# Patient Record
Sex: MALE | Race: WHITE | NOT HISPANIC OR LATINO | Employment: OTHER | URBAN - METROPOLITAN AREA
[De-identification: names, ages, dates, MRNs, and addresses within clinical notes are randomized per-mention and may not be internally consistent; named-entity substitution may affect disease eponyms.]

---

## 2017-02-06 ENCOUNTER — ALLSCRIPTS OFFICE VISIT (OUTPATIENT)
Dept: OTHER | Facility: OTHER | Age: 77
End: 2017-02-06

## 2017-03-06 ENCOUNTER — ALLSCRIPTS OFFICE VISIT (OUTPATIENT)
Dept: OTHER | Facility: OTHER | Age: 77
End: 2017-03-06

## 2017-03-30 ENCOUNTER — GENERIC CONVERSION - ENCOUNTER (OUTPATIENT)
Dept: OTHER | Facility: OTHER | Age: 77
End: 2017-03-30

## 2017-04-05 ENCOUNTER — ALLSCRIPTS OFFICE VISIT (OUTPATIENT)
Dept: OTHER | Facility: OTHER | Age: 77
End: 2017-04-05

## 2017-05-19 ENCOUNTER — ALLSCRIPTS OFFICE VISIT (OUTPATIENT)
Dept: OTHER | Facility: OTHER | Age: 77
End: 2017-05-19

## 2017-06-22 ENCOUNTER — GENERIC CONVERSION - ENCOUNTER (OUTPATIENT)
Dept: OTHER | Facility: OTHER | Age: 77
End: 2017-06-22

## 2017-08-10 ENCOUNTER — GENERIC CONVERSION - ENCOUNTER (OUTPATIENT)
Dept: OTHER | Facility: OTHER | Age: 77
End: 2017-08-10

## 2017-09-28 ENCOUNTER — GENERIC CONVERSION - ENCOUNTER (OUTPATIENT)
Dept: OTHER | Facility: OTHER | Age: 77
End: 2017-09-28

## 2017-09-28 LAB
A/G RATIO (HISTORICAL): 1.5 (CALC) (ref 1–2.5)
ALBUMIN SERPL BCP-MCNC: 4 G/DL (ref 3.6–5.1)
ALP SERPL-CCNC: 63 U/L (ref 40–115)
ALT SERPL W P-5'-P-CCNC: 14 U/L (ref 9–46)
AST SERPL W P-5'-P-CCNC: 17 U/L (ref 10–35)
BASOPHILS # BLD AUTO: 0.5 %
BASOPHILS # BLD AUTO: 33 CELLS/UL (ref 0–200)
BILIRUB SERPL-MCNC: 0.7 MG/DL (ref 0.2–1.2)
BUN SERPL-MCNC: 16 MG/DL (ref 7–25)
BUN/CREA RATIO (HISTORICAL): NORMAL (CALC) (ref 6–22)
CALCIUM (ADJUSTED FOR ALBUMIN) (HISTORICAL): 9.6 MG/DL (CALC) (ref 8.6–10.2)
CALCIUM SERPL-MCNC: 9.3 MG/DL (ref 8.6–10.3)
CHLORIDE SERPL-SCNC: 106 MMOL/L (ref 98–110)
CHOLEST SERPL-MCNC: 209 MG/DL
CHOLEST/HDLC SERPL: 3.5 (CALC)
CO2 SERPL-SCNC: 29 MMOL/L (ref 20–31)
CREAT SERPL-MCNC: 1.09 MG/DL (ref 0.7–1.18)
DEPRECATED RDW RBC AUTO: 12.5 % (ref 11–15)
EGFR AFRICAN AMERICAN (HISTORICAL): 76 ML/MIN/1.73M2
EGFR-AMERICAN CALC (HISTORICAL): 66 ML/MIN/1.73M2
EOSINOPHIL # BLD AUTO: 356 CELLS/UL (ref 15–500)
EOSINOPHIL # BLD AUTO: 5.4 %
GAMMA GLOBULIN (HISTORICAL): 2.7 G/DL (CALC) (ref 1.9–3.7)
GLUCOSE (HISTORICAL): 86 MG/DL (ref 65–99)
HCT VFR BLD AUTO: 46.6 % (ref 38.5–50)
HDLC SERPL-MCNC: 60 MG/DL
HGB BLD-MCNC: 15.2 G/DL (ref 13.2–17.1)
LDL CHOLESTEROL (HISTORICAL): 130 MG/DL (CALC)
LYMPHOCYTES # BLD AUTO: 1597 CELLS/UL (ref 850–3900)
LYMPHOCYTES # BLD AUTO: 24.2 %
MCH RBC QN AUTO: 33.1 PG (ref 27–33)
MCHC RBC AUTO-ENTMCNC: 32.6 G/DL (ref 32–36)
MCV RBC AUTO: 101.5 FL (ref 80–100)
MONOCYTES # BLD AUTO: 667 CELLS/UL (ref 200–950)
MONOCYTES (HISTORICAL): 10.1 %
NEUTROPHILS # BLD AUTO: 3947 CELLS/UL (ref 1500–7800)
NEUTROPHILS # BLD AUTO: 59.8 %
NON-HDL-CHOL (CHOL-HDL) (HISTORICAL): 149 MG/DL (CALC)
PLATELET # BLD AUTO: 165 THOUSAND/UL (ref 140–400)
PMV BLD AUTO: 10.7 FL (ref 7.5–12.5)
POTASSIUM SERPL-SCNC: 4 MMOL/L (ref 3.5–5.3)
RBC # BLD AUTO: 4.59 MILLION/UL (ref 4.2–5.8)
SODIUM SERPL-SCNC: 142 MMOL/L (ref 135–146)
TOTAL PROTEIN (HISTORICAL): 6.7 G/DL (ref 6.1–8.1)
TRIGL SERPL-MCNC: 91 MG/DL
TSH SERPL DL<=0.05 MIU/L-ACNC: 2.4 MIU/L (ref 0.4–4.5)
WBC # BLD AUTO: 6.6 THOUSAND/UL (ref 3.8–10.8)

## 2017-10-05 ENCOUNTER — ALLSCRIPTS OFFICE VISIT (OUTPATIENT)
Dept: OTHER | Facility: OTHER | Age: 77
End: 2017-10-05

## 2017-10-06 NOTE — PROGRESS NOTES
Assessment  1  Hypertension (401 9) (I10)   2  Depression with anxiety (300 4) (F41 8)   3  Enlarged prostate without lower urinary tract symptoms (luts) (600 00) (N40 0)   4  Mixed hyperlipidemia (272 2) (E78 2)   5  Parkinson's disease (332 0) (G20)   6  Need for influenza vaccination (V04 81) (Z23)    Plan  BPH with obstruction/lower urinary tract symptoms    · Tamsulosin HCl - 0 4 MG Oral Capsule; TAKE ONE TABLET BY MOUTH DAILY  Depression with anxiety, Enlarged prostate without lower urinary tract symptoms (luts),  Hypertension, Mixed hyperlipidemia, Parkinson's disease    · (Q) COMPREHENSIVE METABOLIC PNL W/ADJUSTED CALCIUM; Status:Active; Requested for:25Mar2018;    · (Q) LIPID PANEL WITH REFLEX TO DIRECT LDL; Status:Active; Requested  for:25Mar2018;   Depression with anxiety, Memory loss, Parkinson's disease    · Donepezil HCl - 5 MG Oral Tablet; TAKE 1 TABLET DAILY AS DIRECTED  Enlarged prostate without lower urinary tract symptoms (luts)    · Finasteride 5 MG Oral Tablet; TAKE 1 TABLET DAILY AS DIRECTED  Excessive crying, adult    · Nuedexta 20-10 MG Oral Capsule; take 1 capsule daily  Extrinsic asthma    · Qvar 40 MCG/ACT Inhalation Aerosol Solution; INHALE 1 PUFF TWICE DAILY  Health Maintenance    · *VB - Fall Risk Assessment  (Dx Z13 89 Screen for Neurologic Disorder);  Status:Complete;   Done: 88HDB4859 03:54PM  Need for influenza vaccination    · Fluzone High-Dose 0 5 ML Intramuscular Suspension Prefilled Syringe  Unlinked    · BuPROPion HCl ER (XL) 150 MG Oral Tablet Extended Release 24 Hour; Take  1 tablet daily    Discussion/Summary    Reviewed lab in 9/[id number]/91/60/130 low fat dietokoknormalcurrent meds  specialists as scheduled  flu shot today  [id number] 4/2015  Got pneumovax in 2009  call insurance for coverage of zostavax  urology for PSA yearly  in 2002  Refused more colonoscopy  Signed paper  in 6 months with labs         Possible side effects of new medications were reviewed with the patient/guardian today  The treatment plan was reviewed with the patient/guardian  The patient/guardian understands and agrees with the treatment plan      Chief Complaint  Patient presents for a 6 month follow up      History of Present Illness  Pt is here with his wife  home 130/80 per wife  Not on any meds  control  Not on any meds  disease/Memory loss/excessive crying---FU neurology Dr Peace Mullins Q 4 months  On donepezil and wife noticed it helped  He is on sinemet and nuedexta also  wellbutrin daily which helped  Denies depression  Rhinitis/Asthma---Follows ENT Dr Deejay Gallardo and allergist Dr Iker Ponce  Had nasal polys surgery  Uses the QVAR, dymista  Stable  No recent flare up    with urologist Dr Maribel Dahl from [address] [place] [address] [address] yearly  On tamsulosin and finasteride now  Nocturia 1-2/night    with wife  Still drive, not at night  recent falls  Review of Systems    Constitutional: No fever or chills, feels well, no tiredness, no recent weight gain or weight loss  ENT: no complaints of earache, no hearing loss, no nosebleeds, no nasal discharge, no sore throat, no hoarseness  Cardiovascular: No complaints of slow heart rate, no fast heart rate, no chest pain, no palpitations, no leg claudication, no lower extremity  Respiratory: No complaints of shortness of breath, no wheezing, no cough, no SOB on exertion, no orthopnea or PND  Gastrointestinal: No complaints of abdominal pain, no constipation, no nausea or vomiting, no diarrhea or bloody stools  Musculoskeletal: No complaints of arthralgia, no myalgias, no joint swelling or stiffness, no limb pain or swelling  Preventive Quality 65 and Older: The patient is currently asymptomatic Symptoms Include: no recent fall       Active Problems  1  Acute UTI (urinary tract infection) (599 0) (N39 0)   2  Allergic rhinitis (477 9) (J30 9)   3  Anxiety disorder (300 00) (F41 9)   4  BPH with obstruction/lower urinary tract symptoms (600 01,599 69) (N40 1,N13 8)   5  Bronchitis (490) (J40)   6  Depression with anxiety (300 4) (F41 8)   7  Enlarged prostate without lower urinary tract symptoms (luts) (600 00) (N40 0)   8  Excessive crying, adult (780 95) (R45 83)   9  Extrinsic asthma (493 00) (J45 909)   10  Gross hematuria (599 71) (R31 0)   11  Hematuria (599 70) (R31 9)   12  Hypertension (401 9) (I10)   13  Insomnia (780 52) (G47 00)   14  Lightheadedness (780 4) (R42)   15  Medicare annual wellness visit, initial (V70 0) (Z00 00)   16  Medicare annual wellness visit, subsequent (V70 0) (Z00 00)   17  Memory loss (780 93) (R41 3)   18  Mixed hyperlipidemia (272 2) (E78 2)   19  Need for influenza vaccination (V04 81) (Z23)   20  Need for vaccination with 13-polyvalent pneumococcal conjugate vaccine (V03 82) (Z23)   21  Overactive bladder (596 51) (N32 81)   22  Parkinson's disease (332 0) (G20)    Past Medical History  1  History of Acute bronchitis (466 0) (J20 9)   2  History of Acute laryngitis (464 00) (J04 0)   3  History of Acute sinusitis (461 9) (J01 90)   4  History of Acute viral pharyngitis (462) (J02 8,B97 89)   5  History of Depression with anxiety (300 4) (F41 8)   6  History of dermatitis (V13 3) (Z87 2)   7  History of Impacted cerumen, unspecified laterality (380 4) (H61 20)   8  History of Urinary frequency (788 41) (R35 0)   9  History of Vertigo (780 4) (R42)    Surgical History  1  History of Cataract Surgery   2  History of Complete Colonoscopy   3  History of Hernia Repair   4  History of Needle Biopsy Of Prostate    Family History  Father    1  Family history of Coronary Artery Disease (V17 49)   2  Family history of Stroke Syndrome (V17 1)    Social History   · Being A Social Drinker   · Never A Smoker    Current Meds   1  BuPROPion HCl ER (XL) 150 MG Oral Tablet Extended Release 24 Hour; Take 1 tablet   daily Recorded   2  BusPIRone HCl - 10 MG Oral Tablet; Take 1 tablet daily  Requested for: 32IKT5825; Last   Rx:05Jun2017 Ordered   3   Donepezil HCl - 5 MG Oral Tablet; TAKE 1 TABLET DAILY AS DIRECTED; Therapy: 18NVW6046 to (Evaluate:01Feb2018); Last Rx:07Urk6197 Ordered   4  Finasteride 5 MG Oral Tablet; TAKE 1 TABLET DAILY AS DIRECTED; Therapy: 83XEG7330 to (Evaluate:14May2018)  Requested for: 52XQM3526; Last   Rx:19May2017 Ordered   5  Nuedexta 20-10 MG Oral Capsule; take 1 capsule daily; Therapy: 27DPE6715 to (Evaluate:05May2017); Last Rx:05Apr2017 Ordered   6  Qvar 40 MCG/ACT Inhalation Aerosol Solution; INHALE 1 PUFF TWICE DAILY; Therapy: 33GMW2254 to (Evaluate:05Apr2017); Last Rx:06Mar2017 Ordered   7  Tamsulosin HCl - 0 4 MG Oral Capsule; TAKE ONE TABLET BY MOUTH DAILY; Therapy: 94QZB9254 to (Evaluate:14May2018)  Requested for: 73FJR5136; Last   Rx:19May2017 Ordered   8  Triamcinolone Acetonide 0 1 % External Lotion; APPLY 2-3 TIMES DAILY TO AFFECTED   AREA(S); Therapy: 03Rnw7333 to (Evaluate:29Twq2616)  Requested for: 94BCV0392; Last   Rx:09Bdl6226 Ordered    Allergies  1  No Known Drug Allergies    Vitals  Vital Signs    Recorded: 39WCG9829 01:35PM   Temperature 98 3 F, Tympanic   Heart Rate 74, L Radial   Pulse Quality Normal, L Radial   Respiration Quality Normal   Respiration 16   Systolic 107, LUE, Sitting   Diastolic 82, LUE, Sitting   Height 5 ft 8 in   Weight 172 lb 4 oz   BMI Calculated 26 19   BSA Calculated 1 92   Pain Scale 0     Physical Exam    Constitutional   General appearance: No acute distress, well appearing and well nourished  Pulmonary   Respiratory effort: No increased work of breathing or signs of respiratory distress  Auscultation of lungs: Clear to auscultation, equal breath sounds bilaterally, no wheezes, no rales, no rhonci  Cardiovascular   Auscultation of heart: Normal rate and rhythm, normal S1 and S2, without murmurs  Examination of extremities for edema and/or varicosities: Normal     Carotid pulses: Normal     Abdomen   Abdomen: Non-tender, no masses      Liver and spleen: No hepatomegaly or splenomegaly  Lymphatic   Palpation of lymph nodes in neck: No lymphadenopathy  Musculoskeletal   Gait and station: Normal          Results/Data  (Q) CBC (INCLUDES DIFF/PLT) (REFL) [id number] 08:13AM Patrick Cowden     Test Name Result Flag Reference   WHITE BLOOD CELL COUNT 6 6 Thousand/uL  3 8-10 8   RED BLOOD CELL COUNT 4 59 Million/uL  4 20-5 80   HEMOGLOBIN 15 2 g/dL  13 2-17 1   HEMATOCRIT 46 6 %  38 5-50 0    5 fL H 80 0-100 0   MCH 33 1 pg H 27 0-33 0   MCHC 32 6 g/dL  32 0-36 0   RDW 12 5 %  11 0-15 0   PLATELET COUNT 251 Thousand/uL  140-400   MPV 10 7 fL  7 5-12 5   ABSOLUTE NEUTROPHILS 3947 cells/uL  5999-5107   ABSOLUTE LYMPHOCYTES 1597 cells/uL  850-3900   ABSOLUTE MONOCYTES 667 cells/uL  200-950   ABSOLUTE EOSINOPHILS 356 cells/uL     ABSOLUTE BASOPHILS 33 cells/uL  0-200   NEUTROPHILS 59 8 %     LYMPHOCYTES 24 2 %     MONOCYTES 10 1 %     EOSINOPHILS 5 4 %     BASOPHILS 0 5 %       (Q) COMPREHENSIVE METABOLIC PNL W/ADJUSTED CALCIUM 27Sep2017 08:13AM Patrick Cowden     Test Name Result Flag Reference   GLUCOSE 86 mg/dL  65-99   Fasting reference interval   UREA NITROGEN (BUN) 16 mg/dL  7-25   CREATININE 1 09 mg/dL  0 70-1 18   For patients >52years of age, the reference limit  for Creatinine is approximately 13% higher for people  identified as -American  eGFR NON-AFR   AMERICAN 66 mL/min/1 73m2  > OR = 60   eGFR AFRICAN AMERICAN 76 mL/min/1 73m2  > OR = 60   BUN/CREATININE RATIO   4-91   NOT APPLICABLE (calc)   SODIUM 142 mmol/L  135-146   POTASSIUM 4 0 mmol/L  3 5-5 3   CHLORIDE 106 mmol/L     CARBON DIOXIDE 29 mmol/L  20-31   CALCIUM 9 3 mg/dL  8 6-10 3   CALCIUM (ADJUSTED FOR$ALBUMIN) 9 6 mg/dL (calc)  8 6-10 2   PROTEIN, TOTAL 6 7 g/dL  6 1-8 1   ALBUMIN 4 0 g/dL  3 6-5 1   GLOBULIN 2 7 g/dL (calc)  1 9-3 7   ALBUMIN/GLOBULIN RATIO 1 5 (calc)  1 0-2 5   BILIRUBIN, TOTAL 0 7 mg/dL  0 2-1 2   ALKALINE PHOSPHATASE 63 U/L     AST 17 U/L  10-35   ALT 14 U/L  9-46     (Q) LIPID PANEL WITH REFLEX TO DIRECT LDL 64Acu1340 08:13AM Cierra Lucero     Test Name Result Flag Reference   CHOLESTEROL, TOTAL 209 mg/dL H <200   HDL CHOLESTEROL 60 mg/dL  >96   TRIGLICERIDES 91 mg/dL  <947   LDL-CHOLESTEROL 130 mg/dL (calc) H    Reference range: <100     Desirable range <100 mg/dL for patients with CHD or  diabetes and <70 mg/dL for diabetic patients with  known heart disease  LDL-C is now calculated using the Amilcar-Tripp   calculation, which is a validated novel method providing   better accuracy than the Friedewald equation in the   estimation of LDL-C  Dawn Kwon al  [facility]  5744;870(20): 5760-1642   (http://Dentalink/faq/VXT063)   CHOL/HDLC RATIO 3 5 (calc)  <5 0   NON HDL CHOLESTEROL 149 mg/dL (calc) H <130   For patients with diabetes plus 1 major ASCVD risk   factor, treating to a non-HDL-C goal of <100 mg/dL   (LDL-C of <70 mg/dL) is considered a therapeutic   option  (Q) TSH, 3RD GENERATION W/REFLEX TO FT4 [id number] 08:13AM Cierra Lucero   REPORT COMMENT:  FASTING:YES     Test Name Result Flag Reference   TSH W/REFLEX TO FT4 2 40 mIU/L  0 40-4 50     Health Management  Health Maintenance   Medicare Annual Wellness Visit; every 1 year; Next Due: 86SNK7559; Overdue    Future Appointments    Date/Time Provider Specialty Site   04/02/2018 01:20 PM Cierra Lucero MD [address]   05/25/2018 01:45 PM Yvette Dumont [facility] Neurology [facility] [place]     Signatures   Electronically signed by :  Jinny Redding MD; Oct  5 2017  3:55PM EST                       (Author)

## 2017-10-23 ENCOUNTER — GENERIC CONVERSION - ENCOUNTER (OUTPATIENT)
Dept: OTHER | Facility: OTHER | Age: 77
End: 2017-10-23

## 2017-11-13 ENCOUNTER — GENERIC CONVERSION - ENCOUNTER (OUTPATIENT)
Dept: OTHER | Facility: OTHER | Age: 77
End: 2017-11-13

## 2017-11-13 ENCOUNTER — GENERIC CONVERSION - ENCOUNTER (OUTPATIENT)
Dept: FAMILY MEDICINE CLINIC | Facility: CLINIC | Age: 77
End: 2017-11-13

## 2017-12-18 ENCOUNTER — GENERIC CONVERSION - ENCOUNTER (OUTPATIENT)
Dept: OTHER | Facility: OTHER | Age: 77
End: 2017-12-18

## 2017-12-18 ENCOUNTER — GENERIC CONVERSION - ENCOUNTER (OUTPATIENT)
Dept: FAMILY MEDICINE CLINIC | Facility: CLINIC | Age: 77
End: 2017-12-18

## 2018-01-10 NOTE — RESULT NOTES
Message   DW pt on 10/5/2016 OV  Verified Results  (Q) CBC (INCLUDES DIFF/PLT) (REFL) [id number] 09:11AM Zan Grace     Test Name Result Flag Reference   WHITE BLOOD CELL COUNT 5 7 Thousand/uL  3 8-10 8   RED BLOOD CELL COUNT 4 53 Million/uL  4 20-5 80   HEMOGLOBIN 14 8 g/dL  13 2-17 1   HEMATOCRIT 45 0 %  38 5-50 0   MCV 99 3 fL  80 0-100 0   MCH 32 7 pg  27 0-33 0   MCHC 32 9 g/dL  32 0-36 0   RDW 13 9 %  11 0-15 0   PLATELET COUNT 804 Thousand/uL  140-400   MPV 9 0 fL  7 5-11 5   ABSOLUTE NEUTROPHILS 3072 cells/uL  6507-5989   ABSOLUTE LYMPHOCYTES 1528 cells/uL  850-3900   ABSOLUTE MONOCYTES 593 cells/uL  200-950   ABSOLUTE EOSINOPHILS 479 cells/uL     ABSOLUTE BASOPHILS 29 cells/uL  0-200   NEUTROPHILS 53 9 %     LYMPHOCYTES 26 8 %     MONOCYTES 10 4 %     EOSINOPHILS 8 4 %     BASOPHILS 0 5 %       (Q) COMPREHENSIVE METABOLIC PNL W/ADJUSTED CALCIUM [id number] 09:11AM Zan Grace     Test Name Result Flag Reference   GLUCOSE 96 mg/dL  65-99   Fasting reference interval   UREA NITROGEN (BUN) 15 mg/dL  7-25   CREATININE 1 05 mg/dL  0 70-1 18   For patients >52years of age, the reference limit  for Creatinine is approximately 13% higher for people  identified as -American  eGFR NON-AFR   AMERICAN 69 mL/min/1 73m2  > OR = 60   eGFR AFRICAN AMERICAN 80 mL/min/1 73m2  > OR = 60   BUN/CREATININE RATIO   5-92   NOT APPLICABLE (calc)   SODIUM 141 mmol/L  135-146   POTASSIUM 4 4 mmol/L  3 5-5 3   CHLORIDE 104 mmol/L     CARBON DIOXIDE 29 mmol/L  20-31   CALCIUM 9 5 mg/dL  8 6-10 3   CALCIUM (ADJUSTED FOR$ALBUMIN) 9 6 mg/dL (calc)  8 6-10 2   PROTEIN, TOTAL 6 9 g/dL  6 1-8 1   ALBUMIN 4 3 g/dL  3 6-5 1   GLOBULIN 2 6 g/dL (calc)  1 9-3 7   ALBUMIN/GLOBULIN RATIO 1 7 (calc)  1 0-2 5   BILIRUBIN, TOTAL 0 8 mg/dL  0 2-1 2   ALKALINE PHOSPHATASE 49 U/L     AST 17 U/L  10-35   ALT 19 U/L  9-46     (Q) LIPID PANEL WITH REFLEX TO DIRECT LDL [id number] 09:11AM Zan Gathers     Test Name Result Flag Reference   CHOLESTEROL, TOTAL 209 mg/dL H 125-200   HDL CHOLESTEROL 64 mg/dL  > OR = 40   TRIGLICERIDES 77 mg/dL  <031   LDL-CHOLESTEROL 130 mg/dL (calc) H <130   Desirable range <100 mg/dL for patients with CHD or  diabetes and <70 mg/dL for diabetic patients with  known heart disease  CHOL/HDLC RATIO 3 3 (calc)  < OR = 5 0   NON HDL CHOLESTEROL 145 mg/dL (calc)     Target for non-HDL cholesterol is 30 mg/dL higher than   LDL cholesterol target       (Q) TSH, 3RD GENERATION W/REFLEX TO FT4 [id number] 09:11AM Sury Eastern   REPORT COMMENT:  FASTING:YES     Test Name Result Flag Reference   TSH W/REFLEX TO FT4 2 36 mIU/L  0 40-4 50

## 2018-01-13 VITALS
SYSTOLIC BLOOD PRESSURE: 138 MMHG | HEIGHT: 68 IN | RESPIRATION RATE: 16 BRPM | WEIGHT: 172.25 LBS | HEART RATE: 74 BPM | DIASTOLIC BLOOD PRESSURE: 82 MMHG | TEMPERATURE: 98.3 F | BODY MASS INDEX: 26.11 KG/M2

## 2018-01-13 VITALS
HEIGHT: 68 IN | RESPIRATION RATE: 16 BRPM | SYSTOLIC BLOOD PRESSURE: 132 MMHG | WEIGHT: 170 LBS | BODY MASS INDEX: 25.76 KG/M2 | TEMPERATURE: 98 F | DIASTOLIC BLOOD PRESSURE: 80 MMHG | HEART RATE: 68 BPM

## 2018-01-13 VITALS
DIASTOLIC BLOOD PRESSURE: 82 MMHG | HEART RATE: 64 BPM | HEIGHT: 68 IN | WEIGHT: 174.5 LBS | SYSTOLIC BLOOD PRESSURE: 138 MMHG | BODY MASS INDEX: 26.45 KG/M2

## 2018-01-13 VITALS
SYSTOLIC BLOOD PRESSURE: 144 MMHG | HEIGHT: 66 IN | DIASTOLIC BLOOD PRESSURE: 88 MMHG | RESPIRATION RATE: 10 BRPM | OXYGEN SATURATION: 93 % | BODY MASS INDEX: 27.97 KG/M2 | HEART RATE: 68 BPM | TEMPERATURE: 97.8 F | WEIGHT: 174 LBS

## 2018-01-13 NOTE — RESULT NOTES
Message       Pt Seen yesterday for re-eval of hematuria and UTI  Seen by Dr Abdon Holguin who retrieve 5/14 Ucx show Staph sen to Bactrim, antibx was switch and cipro not working and pt was referred to uro as per note         thanks     Verified Results  (1) URINE CULTURE [id number] 12:28PM Komal Sanchez     Test Name Result Flag Reference   CLINICAL REPORT (Report)     Test:        Urine culture  Specimen Type:   Urine  Specimen Date:   5/14/2016 12:28 PM  Result Date:    5/17/2016 11:01 AM  Result Status:   Final result  Resulting Lab:   [facility] [address] [place], [place] [address] [address]            Tel: [phone]      CULTURE                                       ------------------                                   >100,000 cfu/ml Staphylococcus coagulase negative      SUSCEPTIBILITY                                   ------------------                                                       Staphylococcus coagulase                       negative  METHOD                 ANDREA  -------------------------------------  ----------------------------  AMOXICILLIN + CLAVULANATE        <=4/2 ug/ml   Susceptible  AMPICILLIN ($$)             <=2 00 ug/ml   Resistant  AMPICILLIN + SULBACTAM ($)       <=8/4 ug/ml   Susceptible  CEFAZOLIN ($)              <=8 00 ug/ml   Susceptible  GENTAMICIN ($$)             <=4 ug/ml    Susceptible  NITROFURANTOIN             <=32 ug/ml    Susceptible  OXACILLIN                <=0 25 ug/ml   Susceptible  TETRACYCLINE              <=4 ug/ml    Susceptible  TRIMETHOPRIM + SULFAMETHOXAZOLE ($$$)  <=0 5/9 5 ug/ml Susceptible  VANCOMYCIN ($)             1 00 ug/ml    Susceptible       Signatures   Electronically signed by : DAGOBERTO Sapp ; May 18 2016  1:29PM EST                       (Author)

## 2018-01-14 VITALS
OXYGEN SATURATION: 92 % | DIASTOLIC BLOOD PRESSURE: 80 MMHG | WEIGHT: 175 LBS | TEMPERATURE: 97.1 F | HEART RATE: 64 BPM | RESPIRATION RATE: 16 BRPM | HEIGHT: 68 IN | BODY MASS INDEX: 26.52 KG/M2 | SYSTOLIC BLOOD PRESSURE: 120 MMHG

## 2018-01-14 NOTE — RESULT NOTES
Message   US kidney showed bilateral simple renal cysts and nonobstructing kidney stone  Please ask pt to follow up with urology as discussed in [facility] [facility]  Verified Results  [facility] Edmundo Jordan [id number] 02:03PM Myriam Calzada Order Number: [id number]     Test Name Result Flag Reference   US KIDNEY AND BLADDER (Report)     RENAL ULTRASOUND     INDICATION: Gross painless hematuria  COMPARISON: None  TECHNIQUE:  Ultrasound of the retroperitoneum was performed with a curvilinear transducer utilizing volumetric sweeps and still imaging techniques  FINDINGS:     KIDNEYS:   Symmetric and normal size  Right kidney: 11 0 x 4 4 cm  Normal echogenicity and contour  No suspicious masses detected  Simple upper pole cortical cyst measuring 1 7 x 1 5 cm  No hydronephrosis  Nonobstructing 9 mm right mid to upper pole calculus  No perinephric fluid collections  Left kidney: 11 4 x 6 1 cm  Normal echogenicity and contour  No suspicious masses detected  Simple lower pole cyst measures 1 3 x 1 3 cm  No hydronephrosis  Nonobstructing lower pole calculus measuring 5 mm  Additional adjacent lower pole nonobstructing calculus measuring 12 mm  No perinephric fluid collections  URETERS:   Nonvisualized  BLADDER:    Distended with a trabeculated wall  Small bladder diverticulum near the dome  Bilateral ureteral jets detected  Marked prostatomegaly measuring 6 7 x 7 0 x 6 9 cm  Prevoid bladder volume 402 mL  Post void residual bladder volume 245 mL  IMPRESSION:     Bilateral simple renal cysts and nonobstructing intrarenal calculi as described  No hydronephrosis  Trabeculated bladder wall with small diverticulum near the dome  Findings likely related to chronic outlet obstruction due to prostatomegaly measuring 6 7 x 7 0 x 6 9 cm  Prevoid bladder volume 402 mL  Post void residual bladder volume 245 mL  Workstation performed: [id number]     Signed by:    Homar Perez Agnes Calvillo MD   5/24/16

## 2018-01-15 NOTE — RESULT NOTES
Verified Results  (Q) CBC (INCLUDES DIFF/PLT) (REFL) [id number] 08:13AM Carles Eastern     Test Name Result Flag Reference   WHITE BLOOD CELL COUNT 6 6 Thousand/uL  3 8-10 8   RED BLOOD CELL COUNT 4 59 Million/uL  4 20-5 80   HEMOGLOBIN 15 2 g/dL  13 2-17 1   HEMATOCRIT 46 6 %  38 5-50 0    5 fL H 80 0-100 0   MCH 33 1 pg H 27 0-33 0   MCHC 32 6 g/dL  32 0-36 0   RDW 12 5 %  11 0-15 0   PLATELET COUNT 121 Thousand/uL  140-400   MPV 10 7 fL  7 5-12 5   ABSOLUTE NEUTROPHILS 3947 cells/uL  7983-2500   ABSOLUTE LYMPHOCYTES 1597 cells/uL  850-3900   ABSOLUTE MONOCYTES 667 cells/uL  200-950   ABSOLUTE EOSINOPHILS 356 cells/uL     ABSOLUTE BASOPHILS 33 cells/uL  0-200   NEUTROPHILS 59 8 %     LYMPHOCYTES 24 2 %     MONOCYTES 10 1 %     EOSINOPHILS 5 4 %     BASOPHILS 0 5 %       (Q) COMPREHENSIVE METABOLIC PNL W/ADJUSTED CALCIUM [id number] 08:13AM Carles Eastern     Test Name Result Flag Reference   GLUCOSE 86 mg/dL  65-99   Fasting reference interval   UREA NITROGEN (BUN) 16 mg/dL  7-25   CREATININE 1 09 mg/dL  0 70-1 18   For patients >52years of age, the reference limit  for Creatinine is approximately 13% higher for people  identified as -American  eGFR NON-AFR   AMERICAN 66 mL/min/1 73m2  > OR = 60   eGFR AFRICAN AMERICAN 76 mL/min/1 73m2  > OR = 60   BUN/CREATININE RATIO   9-46   NOT APPLICABLE (calc)   SODIUM 142 mmol/L  135-146   POTASSIUM 4 0 mmol/L  3 5-5 3   CHLORIDE 106 mmol/L     CARBON DIOXIDE 29 mmol/L  20-31   CALCIUM 9 3 mg/dL  8 6-10 3   CALCIUM (ADJUSTED FOR$ALBUMIN) 9 6 mg/dL (calc)  8 6-10 2   PROTEIN, TOTAL 6 7 g/dL  6 1-8 1   ALBUMIN 4 0 g/dL  3 6-5 1   GLOBULIN 2 7 g/dL (calc)  1 9-3 7   ALBUMIN/GLOBULIN RATIO 1 5 (calc)  1 0-2 5   BILIRUBIN, TOTAL 0 7 mg/dL  0 2-1 2   ALKALINE PHOSPHATASE 63 U/L     AST 17 U/L  10-35   ALT 14 U/L  9-46     (Q) LIPID PANEL WITH REFLEX TO DIRECT LDL [id number] 08:13AM [facility]     Test Name Result Flag Reference   CHOLESTEROL, TOTAL 209 mg/dL H <200   HDL CHOLESTEROL 60 mg/dL  >94   TRIGLICERIDES 91 mg/dL  <740   LDL-CHOLESTEROL 130 mg/dL (calc) H    Reference range: <100     Desirable range <100 mg/dL for patients with CHD or  diabetes and <70 mg/dL for diabetic patients with  known heart disease  LDL-C is now calculated using the Amilcar-Tripp   calculation, which is a validated novel method providing   better accuracy than the Friedewald equation in the   estimation of LDL-C  Levi Joe al  [facility]  7626;512(50): 3390-2441   (http://REVENTIVE/faq/RZU128)   CHOL/HDLC RATIO 3 5 (calc)  <5 0   NON HDL CHOLESTEROL 149 mg/dL (calc) H <130   For patients with diabetes plus 1 major ASCVD risk   factor, treating to a non-HDL-C goal of <100 mg/dL   (LDL-C of <70 mg/dL) is considered a therapeutic   option       (Q) TSH, 3RD GENERATION W/REFLEX TO FT4 [id number] 08:13AM Dylon Rob   REPORT COMMENT:  FASTING:YES     Test Name Result Flag Reference   TSH W/REFLEX TO FT4 2 40 mIU/L  0 40-4 50

## 2018-01-16 NOTE — RESULT NOTES
Message   Hg 13 9 No anemia        Verified Results  (Q) CBC (H/H, RBC, INDICES, WBC, PLT) [id number] 12:15PM Marleny Hernandez   REPORT COMMENT:  FASTING:NO     Test Name Result Flag Reference   WHITE BLOOD CELL COUNT 7 3 Thousand/uL  3 8-10 8   RED BLOOD CELL COUNT 4 30 Million/uL  4 20-5 80   HEMOGLOBIN 13 9 g/dL  13 2-17 1   HEMATOCRIT 43 5 %  38 5-50 0    2 fL H 80 0-100 0   MCH 32 4 pg  27 0-33 0   MCHC 32 0 g/dL  32 0-36 0   RDW 14 7 %  11 0-15 0   PLATELET COUNT 843 Thousand/uL  140-400   MPV 9 2 fL  7 5-11 5

## 2018-01-16 NOTE — RESULT NOTES
Message   DW pt on 4/6/2016 OV  Verified Results  (Q) LIPID PANEL WITH REFLEX TO DIRECT LDL [id number] 08:36AM Homar Lerma     Test Name Result Flag Reference   CHOLESTEROL, TOTAL 140 mg/dL  125-200   HDL CHOLESTEROL 58 mg/dL  > OR = 40   TRIGLICERIDES 54 mg/dL  <807   LDL-CHOLESTEROL 71 mg/dL (calc)  <130   Desirable range <100 mg/dL for patients with CHD or  diabetes and <70 mg/dL for diabetic patients with  known heart disease  CHOL/HDLC RATIO 2 4 (calc)  < OR = 5 0   NON HDL CHOLESTEROL 82 mg/dL (calc)     Target for non-HDL cholesterol is 30 mg/dL higher than   LDL cholesterol target

## 2018-01-17 NOTE — RESULT NOTES
Message   DW pt on 4/5/2017 OV  Verified Results  (Q) COMPREHENSIVE METABOLIC PNL W/ADJUSTED CALCIUM [id number] 08:31AM [facility]   REPORT COMMENT:  FASTING:YES     Test Name Result Flag Reference   GLUCOSE 95 mg/dL  65-99   Fasting reference interval   UREA NITROGEN (BUN) 15 mg/dL  7-25   CREATININE 1 14 mg/dL  0 70-1 18   For patients >52years of age, the reference limit  for Creatinine is approximately 13% higher for people  identified as -American  eGFR NON-AFR  AMERICAN 63 mL/min/1 73m2  > OR = 60   eGFR AFRICAN AMERICAN 73 mL/min/1 73m2  > OR = 60   BUN/CREATININE RATIO   4-22   NOT APPLICABLE (calc)   SODIUM 142 mmol/L  135-146   POTASSIUM 4 0 mmol/L  3 5-5 3   CHLORIDE 107 mmol/L     CARBON DIOXIDE 27 mmol/L  20-31   CALCIUM 9 2 mg/dL  8 6-10 3   CALCIUM (ADJUSTED FOR$ALBUMIN) 9 4 mg/dL (calc)  8 6-10 2   PROTEIN, TOTAL 6 8 g/dL  6 1-8 1   ALBUMIN 4 1 g/dL  3 6-5 1   GLOBULIN 2 7 g/dL (calc)  1 9-3 7   ALBUMIN/GLOBULIN RATIO 1 5 (calc)  1 0-2 5   BILIRUBIN, TOTAL 0 7 mg/dL  0 2-1 2   ALKALINE PHOSPHATASE 53 U/L     AST 18 U/L  10-35   ALT 20 U/L  9-46     (Q) LIPID PANEL WITH REFLEX TO DIRECT LDL [id number] 08:31AM Sury Cates     Test Name Result Flag Reference   CHOLESTEROL, TOTAL 213 mg/dL H 125-200   HDL CHOLESTEROL 66 mg/dL  > OR = 40   TRIGLICERIDES 67 mg/dL  <718   LDL-CHOLESTEROL 134 mg/dL (calc) H <130   Desirable range <100 mg/dL for patients with CHD or  diabetes and <70 mg/dL for diabetic patients with  known heart disease  CHOL/HDLC RATIO 3 2 (calc)  < OR = 5 0   NON HDL CHOLESTEROL 147 mg/dL (calc)     Target for non-HDL cholesterol is 30 mg/dL higher than   LDL cholesterol target

## 2018-01-22 VITALS
TEMPERATURE: 97.6 F | BODY MASS INDEX: 26.58 KG/M2 | HEIGHT: 68 IN | HEART RATE: 64 BPM | SYSTOLIC BLOOD PRESSURE: 128 MMHG | DIASTOLIC BLOOD PRESSURE: 76 MMHG | WEIGHT: 175.38 LBS | RESPIRATION RATE: 16 BRPM

## 2018-01-22 VITALS
BODY MASS INDEX: 26.54 KG/M2 | DIASTOLIC BLOOD PRESSURE: 86 MMHG | SYSTOLIC BLOOD PRESSURE: 140 MMHG | RESPIRATION RATE: 16 BRPM | WEIGHT: 175.13 LBS | TEMPERATURE: 97.9 F | HEART RATE: 60 BPM | HEIGHT: 68 IN

## 2018-01-22 VITALS — OXYGEN SATURATION: 94 %

## 2018-01-24 NOTE — PROGRESS NOTES
Assessment    1  BPH with obstruction/lower urinary tract symptoms (600 01,599 69) (N40 1,N13 8)    Plan  BPH with obstruction/lower urinary tract symptoms    · Urine Dip Non-Automated- POC; Status:Active - Perform Order; Requested  for:96Ewk8834;    Performed: In Office; ELLY:[id number];BCCXUIZ; For:BPH with obstruction/lower urinary tract symptoms; Ordered By:Alexander Kenney;  BPH with obstruction/lower urinary tract symptoms, Overactive bladder    · Measure Post Void Residual - POC; Status:Active - Perform Order; Requested  for:53Akx4453;    Perform: In Office; ASZ:[id number];DJHLDXF; For:BPH with obstruction/lower urinary tract symptoms, Overactive bladder; Ordered By:Florence Wong;  BPH with obstruction/lower urinary tract symptoms, Overactive bladder, Parkinson's  disease    · Follow-up visit in 6 weeks Evaluation and Treatment  Follow-up  Status: Complete  Done:  [id number]   Ordered; For: BPH with obstruction/lower urinary tract symptoms, Overactive bladder, Parkinson's disease; Ordered By: Kaleb Rodriguez Performed:  Due: 14QKT2128; Last Updated By: Fernando Cleaning; 8/29/2016 11:18:39 AM  Overactive bladder    · Myrbetriq 50 MG Oral Tablet Extended Release 24 Hour; Take 1 tablet daily   Rx By: Kaleb Rodriguez; Dispense: 0 Days ; #:90 Tablet Extended Release 24 Hour; Refill: 3; For: Overactive bladder; ANABELLA = N; Verified Transmission to [address] [place] [address],  [address]; Last Updated By: System, [employer]; 8/29/2016 11:18:30 AM    Discussion/Summary  Discussion Summary:   Sheeba De Leon is a 77year-old gentleman presents for follow up on his overactive bladder managed by Dr Ida Gonzales  Unfortunately, patient ran out of medication  Therefore it is unclear if he empties well on the medication  Myrbetriq 50 mg is given to patient and a prescription sent electronically to his pharmacy  Side effect profile reviewed  We will follow up in 4-6 weeks with a PVR at the visit   We did discuss that this medication may worsen his dementia and Parkinson's  Medication SE Review and Pt Understands Tx: Possible side effects of new medications were reviewed with the patient/guardian today  Understands and agrees with treatment plan: The treatment plan was reviewed with the patient/guardian  The patient/guardian understands and agrees with the treatment plan      Chief Complaint  Chief Complaint Free Text Note Form: PVR; BPH      History of Present Illness  HPI: Dianne Mon is a 77year-old gentleman presents for follow up on his overactive bladder  He was previously followed by Dr Tila López for elevated PSA  He had a negative prostate biopsy in 2003  PSA has been relatively stable since then in the 5-6 range  He has developed relatively significant Parkinson's disease along with some dementia  He is a poor historian, and his wife answers most of the questions  He had a recent ultrasound which revealed bilateral kidney stones, nonobstructing  He is taking tamsulosin daily  He was having improved urination with the Myrbetriq, however, he ran out a week or so ago  Now with worsening nocturia and incontinence  Review of Systems  Complete-Male Urology:   Constitutional: No fever or chills, feels well, no tiredness, no recent weight gain or weight loss  Respiratory: No complaints of shortness of breath, no wheezing, no cough, no SOB on exertion, no orthopnea or PND  Cardiovascular: No complaints of slow heart rate, no fast heart rate, no chest pain, no palpitations, no leg claudication, no lower extremity  Gastrointestinal: No complaints of abdominal pain, no constipation, no nausea or vomiting, no diarrhea or bloody stools  Genitourinary: Empty sensation, incontinence, nocturia, stream quality good, urinary stream starts and stops, but no dysuria, no urinary hesitancy, no hematuria and no feelings of urinary urgency  Musculoskeletal: No complaints of arthralgia, no myalgias, no joint swelling or stiffness, no limb pain or swelling  Integumentary: No complaints of skin rash or skin lesions, no itching, no skin wound, no dry skin  Hematologic/Lymphatic: No complaints of swollen glands, no swollen glands in the neck, does not bleed easily, no easy bruising  Neurological: No compliants of headache, no confusion, no convulsions, no numbness or tingling, no dizziness or fainting, no limb weakness, no difficulty walking  ROS Reviewed:   ROS reviewed  Active Problems    1  Acute UTI (urinary tract infection) (599 0) (N39 0)   2  Allergic rhinitis (477 9) (J30 9)   3  Anxiety disorder (300 00) (F41 9)   4  BPH with obstruction/lower urinary tract symptoms (600 01,599 69) (N40 1,N13 8)   5  Bronchitis (490) (J40)   6  Depression with anxiety (300 4) (F41 8)   7  Dermatitis (692 9) (L30 9)   8  Dizziness (780 4) (R42)   9  Enlarged prostate without lower urinary tract symptoms (luts) (600 00) (N40 0)   10  Extrinsic asthma (493 00) (J45 909)   11  Gross hematuria (599 71) (R31 0)   12  Hematuria (599 70) (R31 9)   13  Hypertension (401 9) (I10)   14  Insomnia (780 52) (G47 00)   15  Medicare annual wellness visit, initial (V70 0) (Z00 00)   16  Memory loss (780 93) (R41 3)   17  Mixed hyperlipidemia (272 2) (E78 2)   18  Need for influenza vaccination (V04 81) (Z23)   19  Need for vaccination with 13-polyvalent pneumococcal conjugate vaccine (V03 82) (Z23)   20  Overactive bladder (596 51) (N32 81)   21  Parkinson's disease (332 0) (G20)    Past Medical History    1  History of Acute bronchitis (466 0) (J20 9)   2  History of Acute laryngitis (464 00) (J04 0)   3  History of Acute sinusitis (461 9) (J01 90)   4  History of Acute viral pharyngitis (462) (J02 8,B97 89)   5  History of Depression with anxiety (300 4) (F41 8)   6  History of Impacted cerumen, unspecified laterality (380 4) (H61 20)   7  History of Urinary frequency (788 41) (R35 0)   8   History of Vertigo (780 4) (R42)  Active Problems And Past Medical History Reviewed: The active problems and past medical history were reviewed and updated today  Surgical History    1  History of Cataract Surgery   2  History of Complete Colonoscopy   3  History of Hernia Repair   4  History of Needle Biopsy Of Prostate  Surgical History Reviewed: The surgical history was reviewed and updated today  Family History  Father    1  Family history of Coronary Artery Disease (V17 49)   2  Family history of Stroke Syndrome (V17 1)  Family History Reviewed: The family history was reviewed and updated today  Social History    · Being A Social Drinker   · Never A Smoker  Social History Reviewed: The social history was reviewed and updated today  The social history was reviewed and is unchanged  Current Meds   1  BuPROPion HCl ER (XL) 150 MG Oral Tablet Extended Release 24 Hour; Take 1 tablet   daily Recorded   2  BusPIRone HCl - 10 MG Oral Tablet; Take 1 tablet twice daily  Requested for:   50Dxw9434; Last Rx:[id number] Ordered   3  Carbidopa-Levodopa  MG Oral Tablet Recorded   4  Donepezil HCl - 10 MG Oral Tablet; take 1 tablet by mouth every day; Therapy: 30ICL4288 to (Evaluate:48Vnh9576); Last Rx:83Tgs7275 Ordered   5  Myrbetriq 50 MG Oral Tablet Extended Release 24 Hour; TAKE 1 CAPSULE Daily   Recorded   6  Tamsulosin HCl - 0 4 MG Oral Capsule; TAKE ONE CAPSULE BY MOUTH EVERY NIGHT   AT BEDTIME; Therapy: 00PFS6407 to (Evaluate:89Szy3788)  Requested for: 79Qko2245; Last   Rx:60Mqo7397 Ordered   7  Triamcinolone Acetonide 0 1 % External Lotion; APPLY 2-3 TIMES DAILY TO AFFECTED   AREA(S); Therapy: 52Ccr8794 to (Evaluate:52Gjl6623)  Requested for: 94Oap2704; Last   Rx:22Iuw3030 Ordered  Medication List Reviewed: The medication list was reviewed and updated today  Allergies    1   No Known Drug Allergies    Vitals  Vital Signs    Recorded: 40KLT4195 16:79NZ   Systolic 227   Diastolic 70   Heart Rate 72   Height 5 ft 8 in   Weight 172 lb 2 08 oz   BMI Calculated 26 17   BSA Calculated 1 92     Physical Exam    Constitutional   General appearance: No acute distress, well appearing and well nourished  Head and Face   Head and face: Normal     Eyes   Conjunctiva and lids: No erythema, swelling or discharge  Ears, Nose, Mouth, and Throat   Hearing: Normal     Pulmonary   Respiratory effort: No increased work of breathing or signs of respiratory distress  Musculoskeletal   Gait and station: Normal     Neurologic   Cranial nerves: Cranial nerves 2-12 intact  Psychiatric   Judgment and insight: Normal     Mood and affect: Normal        Procedure    Procedure: Bladder Ultrasound Post Void Residual    Test indication: nocturia  Equipment And Procedure: The patient voided  U/S Findings: PVR 17Ml  Future Appointments    Date/Time Provider Specialty Site   10/05/2016 10:40 AM Josselin Noonan MD Family [address]   10/13/2016 01:45 PM Cl Yi [facility] Urology [address]     Signatures   Electronically signed by : Jefferson Soliman [facility];  Aug 29 2016 12:25PM EST                       (Author)    Electronically signed by : DAGOBERTO Guerra ; Aug 30 2016  2:49PM EST

## 2018-02-28 RX ORDER — AZELASTINE HYDROCHLORIDE AND FLUTICASONE PROPIONATE 137; 50 UG/1; UG/1
SPRAY, METERED NASAL
Refills: 5 | COMMUNITY
Start: 2018-01-23 | End: 2019-01-05 | Stop reason: SDUPTHER

## 2018-02-28 RX ORDER — TRIAMCINOLONE ACETONIDE 1 MG/ML
LOTION TOPICAL 3 TIMES DAILY
COMMUNITY
Start: 2016-08-22 | End: 2019-02-05 | Stop reason: ALTCHOICE

## 2018-02-28 RX ORDER — FINASTERIDE 5 MG/1
1 TABLET, FILM COATED ORAL DAILY
COMMUNITY
Start: 2016-11-11 | End: 2018-07-31 | Stop reason: SDUPTHER

## 2018-02-28 RX ORDER — BUPROPION HYDROCHLORIDE 150 MG/1
1 TABLET ORAL DAILY
COMMUNITY

## 2018-02-28 RX ORDER — TAMSULOSIN HYDROCHLORIDE 0.4 MG/1
1 CAPSULE ORAL DAILY
COMMUNITY
Start: 2016-05-26 | End: 2018-05-14 | Stop reason: SDUPTHER

## 2018-02-28 RX ORDER — DONEPEZIL HYDROCHLORIDE 10 MG/1
TABLET, FILM COATED ORAL
Refills: 3 | COMMUNITY
Start: 2018-01-29

## 2018-02-28 RX ORDER — AMMONIUM LACTATE 12 G/100G
CREAM TOPICAL
Refills: 1 | COMMUNITY
Start: 2018-01-10 | End: 2019-02-22

## 2018-03-01 ENCOUNTER — OFFICE VISIT (OUTPATIENT)
Dept: FAMILY MEDICINE CLINIC | Facility: CLINIC | Age: 78
End: 2018-03-01
Payer: COMMERCIAL

## 2018-03-01 VITALS
SYSTOLIC BLOOD PRESSURE: 132 MMHG | DIASTOLIC BLOOD PRESSURE: 60 MMHG | HEART RATE: 76 BPM | OXYGEN SATURATION: 92 % | WEIGHT: 177.8 LBS | BODY MASS INDEX: 26.95 KG/M2 | TEMPERATURE: 97.4 F | RESPIRATION RATE: 16 BRPM | HEIGHT: 68 IN

## 2018-03-01 DIAGNOSIS — J45.41 MODERATE PERSISTENT ASTHMA WITH EXACERBATION: Primary | ICD-10-CM

## 2018-03-01 PROBLEM — R45.83: Status: ACTIVE | Noted: 2017-04-05

## 2018-03-01 PROCEDURE — 99214 OFFICE O/P EST MOD 30 MIN: CPT | Performed by: FAMILY MEDICINE

## 2018-03-01 RX ORDER — UBIDECARENONE 100 %
1 POWDER (GRAM) MISCELLANEOUS
COMMUNITY
End: 2019-02-22

## 2018-03-01 RX ORDER — SACCHAROMYCES BOULARDII 250 MG
1 CAPSULE ORAL
COMMUNITY

## 2018-03-01 RX ORDER — AZITHROMYCIN 250 MG/1
250 TABLET, FILM COATED ORAL EVERY 24 HOURS
Qty: 6 TABLET | Refills: 0 | Status: SHIPPED | OUTPATIENT
Start: 2018-03-01 | End: 2018-03-06

## 2018-03-01 RX ORDER — CYCLOSPORINE 0.5 MG/ML
1 EMULSION OPHTHALMIC EVERY 12 HOURS
COMMUNITY
Start: 2015-09-29

## 2018-03-01 RX ORDER — ALBUTEROL SULFATE 90 UG/1
2 AEROSOL, METERED RESPIRATORY (INHALATION) EVERY 6 HOURS PRN
Qty: 1 INHALER | Refills: 5 | Status: SHIPPED | OUTPATIENT
Start: 2018-03-01 | End: 2018-03-31

## 2018-03-01 RX ORDER — METHYLPREDNISOLONE 4 MG/1
TABLET ORAL
Qty: 21 TABLET | Refills: 0 | Status: SHIPPED | OUTPATIENT
Start: 2018-03-01 | End: 2018-04-02 | Stop reason: CLARIF

## 2018-03-01 NOTE — PROGRESS NOTES
Patient is here for a follow up  Assessment/Plan:         Diagnoses and all orders for this visit:    Moderate persistent asthma with exacerbation  -     albuterol (PROAIR HFA) 90 mcg/act inhaler; Inhale 2 puffs every 6 (six) hours as needed for wheezing for up to 30 days  -     azithromycin (ZITHROMAX) 250 mg tablet; Take 1 tablet (250 mg total) by mouth every 24 hours for 5 days Take 2 tabs on day 1, then take 1 tab daily from day 2-day 5   -     Methylprednisolone 4 MG TBPK; Use as directed on package  -     XR chest pa & lateral; Future    Other orders  -     cycloSPORINE (RESTASIS) 0 05 % ophthalmic emulsion; Apply 1 drop to eye every 12 (twelve) hours  -     Coenzyme Q10 (UBIDECARENONE) POWD; Take 1 tablet by mouth  -     saccharomyces boulardii (FLORASTOR) 250 mg capsule; Take 1 tablet by mouth          Subjective:      Patient ID: Zena Claude  is a 69 y o  male  HPI  Pt is here with his wife  C/o cough and wheezing for several weeks  Cough with phlegm  Wheezing  Denies SOB  Postnasal drip  Denies fever, chest pain, nausea, vomiting or abdominal pain  I saw him in 11/2017 for similar symptoms  Wife states he was better but still always got wheezing  Allergic Rhinitis/Asthma---Follows ENT Dr Kathi Fraire and allergist Dr Shruti Velasquez  Had nasal polys surgery  Uses the QVAR, dymista  The following portions of the patient's history were reviewed and updated as appropriate: allergies, current medications, past family history, past medical history, past social history, past surgical history and problem list     Review of Systems   Constitutional: Negative for appetite change, chills and fever  HENT: Negative for congestion, ear pain, sinus pain and sore throat  Eyes: Negative for discharge and itching  Respiratory: Positive for cough and wheezing  Negative for apnea, chest tightness and shortness of breath  Cardiovascular: Negative for chest pain, palpitations and leg swelling  Gastrointestinal: Negative for abdominal pain, anal bleeding, constipation, diarrhea, nausea and vomiting  Endocrine: Negative for cold intolerance, heat intolerance and polyuria  Genitourinary: Negative for difficulty urinating and dysuria  Musculoskeletal: Negative for arthralgias, back pain and myalgias  Skin: Negative for rash  Neurological: Negative for dizziness and headaches  Psychiatric/Behavioral: Negative for agitation  Objective:      /60   Pulse 76   Temp (!) 97 4 °F (36 3 °C) (Tympanic)   Resp 16   Ht 5' 8" (1 727 m)   Wt 80 6 kg (177 lb 12 8 oz)   BMI 27 03 kg/m²          Physical Exam   Constitutional: He appears well-developed  HENT:   Head: Normocephalic and atraumatic  Eyes: Conjunctivae are normal  Pupils are equal, round, and reactive to light  Neck: Normal range of motion  Neck supple  Cardiovascular: Normal rate, regular rhythm, normal heart sounds and intact distal pulses  Exam reveals no gallop and no friction rub  No murmur heard  Pulmonary/Chest: Effort normal  No respiratory distress  He has wheezes  He has no rales  He exhibits no tenderness  Abdominal: Soft  Bowel sounds are normal    Musculoskeletal: Normal range of motion  Neurological: He is alert

## 2018-03-08 ENCOUNTER — TRANSCRIBE ORDERS (OUTPATIENT)
Dept: RADIOLOGY | Facility: HOSPITAL | Age: 78
End: 2018-03-08

## 2018-03-08 ENCOUNTER — HOSPITAL ENCOUNTER (OUTPATIENT)
Dept: RADIOLOGY | Facility: HOSPITAL | Age: 78
Discharge: HOME | End: 2018-03-08
Payer: COMMERCIAL

## 2018-03-08 DIAGNOSIS — J45.41 MODERATE PERSISTENT ASTHMA WITH EXACERBATION: ICD-10-CM

## 2018-03-08 PROCEDURE — 71046 X-RAY EXAM CHEST 2 VIEWS: CPT

## 2018-03-21 ENCOUNTER — TRANSCRIBE ORDERS (OUTPATIENT)
Dept: LAB | Facility: HOSPITAL | Age: 78
End: 2018-03-21

## 2018-03-21 DIAGNOSIS — N40.0 ENLARGED PROSTATE: ICD-10-CM

## 2018-03-21 DIAGNOSIS — I10 ESSENTIAL HYPERTENSION, MALIGNANT: ICD-10-CM

## 2018-03-21 DIAGNOSIS — D68.52 HETEROZYGOUS FOR PROTHROMBIN G20210A MUTATION (HCC): ICD-10-CM

## 2018-03-21 DIAGNOSIS — E78.2 MIXED HYPERLIPIDEMIA: ICD-10-CM

## 2018-03-21 DIAGNOSIS — F41.8 DEPRESSION WITH ANXIETY: Primary | ICD-10-CM

## 2018-03-23 ENCOUNTER — OFFICE VISIT (OUTPATIENT)
Dept: FAMILY MEDICINE CLINIC | Facility: CLINIC | Age: 78
End: 2018-03-23
Payer: COMMERCIAL

## 2018-03-23 VITALS
HEART RATE: 76 BPM | SYSTOLIC BLOOD PRESSURE: 130 MMHG | TEMPERATURE: 98.3 F | BODY MASS INDEX: 26.82 KG/M2 | DIASTOLIC BLOOD PRESSURE: 78 MMHG | RESPIRATION RATE: 12 BRPM | WEIGHT: 176.4 LBS

## 2018-03-23 DIAGNOSIS — G31.83 LEWY BODY DEMENTIA WITHOUT BEHAVIORAL DISTURBANCE (HCC): ICD-10-CM

## 2018-03-23 DIAGNOSIS — T14.8XXA INFECTED WOUND: Primary | ICD-10-CM

## 2018-03-23 DIAGNOSIS — F02.80 LEWY BODY DEMENTIA WITHOUT BEHAVIORAL DISTURBANCE (HCC): ICD-10-CM

## 2018-03-23 DIAGNOSIS — L08.9 INFECTED WOUND: Primary | ICD-10-CM

## 2018-03-23 DIAGNOSIS — G20 PARKINSON'S DISEASE (HCC): ICD-10-CM

## 2018-03-23 PROCEDURE — 99214 OFFICE O/P EST MOD 30 MIN: CPT | Performed by: NURSE PRACTITIONER

## 2018-03-23 PROCEDURE — 90471 IMMUNIZATION ADMIN: CPT

## 2018-03-23 PROCEDURE — 90714 TD VACC NO PRESV 7 YRS+ IM: CPT

## 2018-03-23 RX ORDER — BUPROPION HYDROCHLORIDE 150 MG/1
150 TABLET ORAL
COMMUNITY
Start: 2018-03-12 | End: 2018-03-23 | Stop reason: SDUPTHER

## 2018-03-23 RX ORDER — CEPHALEXIN 500 MG/1
500 CAPSULE ORAL EVERY 6 HOURS SCHEDULED
Qty: 28 CAPSULE | Refills: 0 | Status: SHIPPED | OUTPATIENT
Start: 2018-03-23 | End: 2018-03-30

## 2018-03-23 NOTE — PROGRESS NOTES
Chief Complaint   Patient presents with    Wound Infection     Wound infection on left forearm for 1 week  Assessment/Plan:    1  Left FA wounds- his wounds were cleaned today by myself  I placed Bacitracin ointment to the area and redressed the wounds with nonstick pads and a gauze wrap  His wife was given extra supplies  She should change her 's dressing daily  Clean wound with a gentle soap (such as Dove, fragrance free) and warm water  To start Keflex as well  Side effects reviewed, recommended a Probiotic while on abx  Call our office if wound develops more drainage, redness, pain or any swelling to site  A Td booster was also given in the office today     2  Parkinson's/ Lewy Body Dementia- stable  Managed by [facility]     Diagnoses and all orders for this visit:    Infected wound    Lewy body dementia without behavioral disturbance    Parkinson's disease ([facility] [address] )          Subjective:      Patient ID: Mckinley Jhaveri  is a 69 y o  male      HPI  Pt presents with his wife today for an acute visit     He was standing in his walk-in closet when he lost his balance about one week ago   Pt has Parkinson's Disease, Lewy body dementia - follows with Neurology at [facility]    He ended up dragging his arm along the wall which had nails sticking out of it (wife hangs her jewelry off of nails)    Has a few small wounds on his left lateral forearm with one large skin tear     He did not hit his head or have LOC- just slumped to the ground     His wife put a gauze dressing on his wound   Has not changed the dressing in about 4-5 days and has not been cleaning the wounds  Notes some yellowish discharge and small amount of bleeding  Area is tender to the touch     No fevers or chills  He is able to fully move his left arm     Last Tdap 10/2009    The following portions of the patient's history were reviewed and updated as appropriate: allergies, current medications, past medical history, past social history and problem list     Review of Systems   Constitutional: Negative for chills, diaphoresis, fatigue and fever  Eyes: Negative for visual disturbance  Respiratory: Negative for cough, chest tightness, shortness of breath and wheezing  Cardiovascular: Negative for chest pain, palpitations and leg swelling  Musculoskeletal: Positive for gait problem  Negative for arthralgias and myalgias  Skin: Positive for wound  Negative for rash  Neurological: Negative for dizziness and headaches  As noted in HPI   Hematological: Negative for adenopathy  Does not bruise/bleed easily  Objective:      /78 (BP Location: Right arm, Patient Position: Sitting, Cuff Size: Standard)   Pulse 76   Temp 98 3 °F (36 8 °C) (Tympanic)   Resp 12   Wt 80 kg (176 lb 6 4 oz)   BMI 26 82 kg/m²          Physical Exam   Constitutional: He appears well-developed and well-nourished  No distress  HENT:   Head: Normocephalic and atraumatic  Eyes: Pupils are equal, round, and reactive to light  Cardiovascular: Normal rate, regular rhythm and normal heart sounds  No murmur heard  Pulmonary/Chest: Effort normal and breath sounds normal  No respiratory distress  He has no wheezes  Musculoskeletal: Normal range of motion  Shuffling gait    Skin: He is not diaphoretic  Psychiatric: He has a normal mood and affect

## 2018-03-23 NOTE — PATIENT INSTRUCTIONS
Change dressing daily   Wash wound with gentle soap and warm water daily   Apply Bacitracin ointment to area   Start Keflex antibiotic   Call if any worsening redness, drainage, pain, swelling, fevers/ chills

## 2018-03-30 ENCOUNTER — APPOINTMENT (OUTPATIENT)
Dept: LAB | Facility: HOSPITAL | Age: 78
End: 2018-03-30
Payer: COMMERCIAL

## 2018-03-30 LAB
CHOLEST SERPL-MCNC: 203 MG/DL (ref 50–200)
HDLC SERPL-MCNC: 66 MG/DL (ref 40–60)
LDLC SERPL CALC-MCNC: 124 MG/DL (ref 0–100)
TRIGL SERPL-MCNC: 67 MG/DL
VENIPUNCTURE: NORMAL

## 2018-03-30 PROCEDURE — 36415 COLL VENOUS BLD VENIPUNCTURE: CPT

## 2018-03-30 PROCEDURE — 80061 LIPID PANEL: CPT

## 2018-04-02 ENCOUNTER — OFFICE VISIT (OUTPATIENT)
Dept: FAMILY MEDICINE CLINIC | Facility: CLINIC | Age: 78
End: 2018-04-02
Payer: COMMERCIAL

## 2018-04-02 VITALS
RESPIRATION RATE: 16 BRPM | SYSTOLIC BLOOD PRESSURE: 110 MMHG | HEIGHT: 68 IN | HEART RATE: 70 BPM | OXYGEN SATURATION: 94 % | TEMPERATURE: 97.1 F | BODY MASS INDEX: 26.83 KG/M2 | WEIGHT: 177 LBS | DIASTOLIC BLOOD PRESSURE: 78 MMHG

## 2018-04-02 DIAGNOSIS — G20 PARKINSON'S DISEASE (HCC): ICD-10-CM

## 2018-04-02 DIAGNOSIS — G31.83 LEWY BODY DEMENTIA WITHOUT BEHAVIORAL DISTURBANCE (HCC): ICD-10-CM

## 2018-04-02 DIAGNOSIS — F41.8 DEPRESSION WITH ANXIETY: ICD-10-CM

## 2018-04-02 DIAGNOSIS — E78.2 MIXED HYPERLIPIDEMIA: ICD-10-CM

## 2018-04-02 DIAGNOSIS — I10 ESSENTIAL HYPERTENSION: Primary | ICD-10-CM

## 2018-04-02 DIAGNOSIS — F02.80 LEWY BODY DEMENTIA WITHOUT BEHAVIORAL DISTURBANCE (HCC): ICD-10-CM

## 2018-04-02 DIAGNOSIS — J45.30 MILD PERSISTENT EXTRINSIC ASTHMA WITHOUT COMPLICATION: ICD-10-CM

## 2018-04-02 PROCEDURE — 3078F DIAST BP <80 MM HG: CPT | Performed by: FAMILY MEDICINE

## 2018-04-02 PROCEDURE — 3074F SYST BP LT 130 MM HG: CPT | Performed by: FAMILY MEDICINE

## 2018-04-02 PROCEDURE — 99214 OFFICE O/P EST MOD 30 MIN: CPT | Performed by: FAMILY MEDICINE

## 2018-04-02 RX ORDER — BUSPIRONE HYDROCHLORIDE 10 MG/1
10 TABLET ORAL DAILY
Qty: 90 TABLET | Refills: 1 | Status: SHIPPED | OUTPATIENT
Start: 2018-04-02 | End: 2018-10-31 | Stop reason: CLARIF

## 2018-04-02 NOTE — PROGRESS NOTES
Chief Complaint   Patient presents with    Follow-up     6 month follow up     Assessment/Plan:    Reviewed lab in 3/2018  Lipid 203/67/66/124 low fat diet  Cont current meds  Restart buspirone 10mg Qd  FU specialists as scheduled  Got zlahpor09 4/2015  Got pneumovax in 2009  Got Td 2018  Will call insurance for coverage of zostavax  [facility] urology for PSA yearly  Colonoscopy in 2002  Refused more colonoscopy  Signed paper  RTO in 6 months with labs  Diagnoses and all orders for this visit:    Essential hypertension  Comments:  Diet control  good  Orders:  -     CBC; Future  -     Comprehensive metabolic panel; Future  -     TSH, 3rd generation; Future    Mixed hyperlipidemia  Comments:  Diet control  not on any meds for it  Parkinson's disease [facility])  Comments:  [facility] neurology  Continue sinemet  Lewy body dementia without behavioral disturbance  Comments:  [facility] neurology  Continue donepezil  Depression with anxiety  Comments:  Continue wellbutrin 150mg QD  Restart buspiron 10mg Qd  Orders:  -     busPIRone (BUSPAR) 10 mg tablet; Take 1 tablet (10 mg total) by mouth daily for 90 days    Mild persistent extrinsic asthma without complication  Comments:  [facility] allergist  Continue Qvar  Other orders  -     Multiple Vitamins-Minerals (CENTRUM SILVER 50+MEN PO); Take 1 tablet by mouth daily          Subjective:      Patient ID: Ena Barragan  is a 69 y o  male  Hypertension   This is a chronic problem  The current episode started more than 1 year ago  The problem has been gradually improving since onset  The problem is controlled  Associated symptoms include anxiety  Pertinent negatives include no chest pain, headaches, palpitations, peripheral edema or shortness of breath  There are no associated agents to hypertension  Risk factors for coronary artery disease include dyslipidemia and male gender  Past treatments include nothing  The current treatment provides mild improvement   There are no compliance problems  There is no history of chronic renal disease  Hyperlipidemia   This is a chronic problem  The current episode started more than 1 year ago  The problem is controlled  Recent lipid tests were reviewed and are variable  He has no history of chronic renal disease, diabetes, hypothyroidism or obesity  There are no known factors aggravating his hyperlipidemia  Pertinent negatives include no chest pain, myalgias or shortness of breath  He is currently on no antihyperlipidemic treatment  Compliance problems include adherence to diet  Risk factors for coronary artery disease include male sex  Pt is here with his wife  HTN---Diet control  At home 130/80 per wife  Not on any meds  Hyperlipidemia---Diet control  Not on any meds  Parkinson's disease/Memory loss/excessive crying---[facility] neurology Dr Chuy Fisher Q 4 months  On donepezil and wife noticed it helped  He is on sinemet and nuedexta also  Depression/Anxiety---On wellbutrin 150mg daily now  Wife states Dr Chuy Fisher stopped buspiron 10mg since 1/2018  Wife noticed pt is more anxious and depressed  Would like to restart buspiron  Allergic Rhinitis/Asthma---Follows ENT Dr Sidney Ko and allergist Dr Delmis Shepherd  Had nasal polys surgery  Uses the QVAR, dymista  Stable  BPH---Follows with urologist Dr Krysta Carrillo yearly  On tamsulosin and finasteride now  Nocturia 1-2/night  Lives with wife  Still drive, not at night  Denies recent falls  The following portions of the patient's history were reviewed and updated as appropriate: allergies, current medications, past family history, past medical history, past social history, past surgical history and problem list     Review of Systems   Constitutional: Negative for appetite change, chills and fever  HENT: Negative for congestion, ear pain, sinus pain and sore throat  Eyes: Negative for discharge and itching     Respiratory: Negative for apnea, cough, chest tightness, shortness of breath and wheezing  Cardiovascular: Negative for chest pain, palpitations and leg swelling  Gastrointestinal: Negative for abdominal pain, anal bleeding, constipation, diarrhea, nausea and vomiting  Endocrine: Negative for cold intolerance, heat intolerance and polyuria  Genitourinary: Negative for difficulty urinating and dysuria  Musculoskeletal: Negative for arthralgias, back pain and myalgias  Skin: Negative for rash  Neurological: Negative for dizziness and headaches  Psychiatric/Behavioral: Negative for agitation  Objective:      /78 (BP Location: Left arm, Patient Position: Sitting, Cuff Size: Adult)   Pulse 70   Temp (!) 97 1 °F (36 2 °C) (Tympanic)   Resp 16   Ht 5' 8" (1 727 m)   Wt 80 3 kg (177 lb)   SpO2 94%   BMI 26 91 kg/m²          Physical Exam   Constitutional: He appears well-developed  HENT:   Head: Normocephalic and atraumatic  Eyes: Conjunctivae are normal  Pupils are equal, round, and reactive to light  Neck: Normal range of motion  Neck supple  Cardiovascular: Normal rate, regular rhythm, normal heart sounds and intact distal pulses  Exam reveals no gallop and no friction rub  No murmur heard  Pulmonary/Chest: Effort normal and breath sounds normal  No respiratory distress  He has no wheezes  He has no rales  Abdominal: Soft  Bowel sounds are normal    Musculoskeletal: Normal range of motion  Neurological: He is alert

## 2018-04-10 ENCOUNTER — APPOINTMENT (EMERGENCY)
Dept: RADIOLOGY | Facility: HOSPITAL | Age: 78
End: 2018-04-10
Payer: COMMERCIAL

## 2018-04-10 ENCOUNTER — HOSPITAL ENCOUNTER (OUTPATIENT)
Facility: HOSPITAL | Age: 78
Setting detail: OBSERVATION
Discharge: HOME | End: 2018-04-10
Attending: EMERGENCY MEDICINE | Admitting: HOSPITALIST
Payer: COMMERCIAL

## 2018-04-10 VITALS
DIASTOLIC BLOOD PRESSURE: 88 MMHG | HEART RATE: 72 BPM | SYSTOLIC BLOOD PRESSURE: 148 MMHG | RESPIRATION RATE: 18 BRPM | HEIGHT: 68 IN | TEMPERATURE: 97.8 F | WEIGHT: 176.2 LBS | BODY MASS INDEX: 26.7 KG/M2 | OXYGEN SATURATION: 95 %

## 2018-04-10 DIAGNOSIS — R05.9 COUGH: ICD-10-CM

## 2018-04-10 DIAGNOSIS — R06.00 DYSPNEA: Primary | ICD-10-CM

## 2018-04-10 DIAGNOSIS — R94.31 EKG ABNORMALITIES: ICD-10-CM

## 2018-04-10 PROBLEM — R06.02 SOB (SHORTNESS OF BREATH): Status: ACTIVE | Noted: 2018-04-10

## 2018-04-10 LAB
ANION GAP SERPL CALCULATED.3IONS-SCNC: 4 MMOL/L (ref 4–13)
ATRIAL RATE: 71 BPM
ATRIAL RATE: 74 BPM
BASOPHILS # BLD AUTO: 0.02 THOUSANDS/ΜL (ref 0–0.1)
BASOPHILS NFR BLD AUTO: 0 % (ref 0–1)
BUN SERPL-MCNC: 12 MG/DL (ref 5–25)
CALCIUM SERPL-MCNC: 9.1 MG/DL
CHLORIDE SERPL-SCNC: 107 MMOL/L (ref 100–108)
CO2 SERPL-SCNC: 28 MMOL/L (ref 21–32)
CREAT SERPL-MCNC: 1.03 MG/DL (ref 0.6–1.3)
EOSINOPHIL # BLD AUTO: 0.22 THOUSAND/ΜL (ref 0–0.61)
EOSINOPHIL NFR BLD AUTO: 3 % (ref 0–6)
ERYTHROCYTE [DISTWIDTH] IN BLOOD BY AUTOMATED COUNT: 13.2 % (ref 11.6–15.1)
GFR SERPL CREATININE-BSD FRML MDRD: 70 ML/MIN/1.73SQ M
GLUCOSE SERPL-MCNC: 94 MG/DL (ref 65–140)
HCT VFR BLD AUTO: 47.8 % (ref 36.5–49.3)
HGB BLD-MCNC: 16.4 G/DL (ref 12–17)
LYMPHOCYTES # BLD AUTO: 1.57 THOUSANDS/ΜL (ref 0.6–4.47)
LYMPHOCYTES NFR BLD AUTO: 21 % (ref 14–44)
MCH RBC QN AUTO: 34.6 PG (ref 26.8–34.3)
MCHC RBC AUTO-ENTMCNC: 34.3 G/DL (ref 31.4–37.4)
MCV RBC AUTO: 101 FL (ref 82–98)
MONOCYTES # BLD AUTO: 0.64 THOUSAND/ΜL (ref 0.17–1.22)
MONOCYTES NFR BLD AUTO: 9 % (ref 4–12)
NEUTROPHILS # BLD AUTO: 5.08 THOUSANDS/ΜL (ref 1.85–7.62)
NEUTS SEG NFR BLD AUTO: 67 % (ref 43–75)
NRBC BLD AUTO-RTO: 0 /100 WBCS
NT-PROBNP SERPL-MCNC: 237 PG/ML
P AXIS: 61 DEGREES
P AXIS: 65 DEGREES
PLATELET # BLD AUTO: 170 THOUSANDS/UL (ref 149–390)
PLATELET # BLD AUTO: 172 THOUSANDS/UL (ref 149–390)
PMV BLD AUTO: 10.8 FL (ref 8.9–12.7)
PMV BLD AUTO: 11 FL (ref 8.9–12.7)
POTASSIUM SERPL-SCNC: 4 MMOL/L (ref 3.5–5.3)
PR INTERVAL: 136 MS
PR INTERVAL: 152 MS
QRS AXIS: -68 DEGREES
QRS AXIS: -74 DEGREES
QRSD INTERVAL: 150 MS
QRSD INTERVAL: 156 MS
QT INTERVAL: 420 MS
QT INTERVAL: 450 MS
QTC INTERVAL: 456 MS
QTC INTERVAL: 499 MS
RBC # BLD AUTO: 4.74 MILLION/UL (ref 3.88–5.62)
SODIUM SERPL-SCNC: 139 MMOL/L (ref 136–145)
T WAVE AXIS: 52 DEGREES
T WAVE AXIS: 62 DEGREES
TROPONIN I SERPL-MCNC: <0.02 NG/ML
VENTRICULAR RATE: 71 BPM
VENTRICULAR RATE: 74 BPM
WBC # BLD AUTO: 7.54 THOUSAND/UL (ref 4.31–10.16)

## 2018-04-10 PROCEDURE — 36415 COLL VENOUS BLD VENIPUNCTURE: CPT | Performed by: EMERGENCY MEDICINE

## 2018-04-10 PROCEDURE — 80048 BASIC METABOLIC PNL TOTAL CA: CPT | Performed by: EMERGENCY MEDICINE

## 2018-04-10 PROCEDURE — 85025 COMPLETE CBC W/AUTO DIFF WBC: CPT | Performed by: EMERGENCY MEDICINE

## 2018-04-10 PROCEDURE — 71046 X-RAY EXAM CHEST 2 VIEWS: CPT

## 2018-04-10 PROCEDURE — 99236 HOSP IP/OBS SAME DATE HI 85: CPT | Performed by: HOSPITALIST

## 2018-04-10 PROCEDURE — 93010 ELECTROCARDIOGRAM REPORT: CPT | Performed by: INTERNAL MEDICINE

## 2018-04-10 PROCEDURE — 84484 ASSAY OF TROPONIN QUANT: CPT | Performed by: EMERGENCY MEDICINE

## 2018-04-10 PROCEDURE — 93005 ELECTROCARDIOGRAM TRACING: CPT | Performed by: EMERGENCY MEDICINE

## 2018-04-10 PROCEDURE — 99285 EMERGENCY DEPT VISIT HI MDM: CPT

## 2018-04-10 PROCEDURE — 93005 ELECTROCARDIOGRAM TRACING: CPT

## 2018-04-10 PROCEDURE — 85049 AUTOMATED PLATELET COUNT: CPT | Performed by: HOSPITALIST

## 2018-04-10 PROCEDURE — 94640 AIRWAY INHALATION TREATMENT: CPT

## 2018-04-10 PROCEDURE — 93005 ELECTROCARDIOGRAM TRACING: CPT | Performed by: HOSPITALIST

## 2018-04-10 PROCEDURE — 84484 ASSAY OF TROPONIN QUANT: CPT | Performed by: HOSPITALIST

## 2018-04-10 PROCEDURE — 83880 ASSAY OF NATRIURETIC PEPTIDE: CPT | Performed by: EMERGENCY MEDICINE

## 2018-04-10 RX ORDER — ACETAMINOPHEN 325 MG/1
650 TABLET ORAL EVERY 4 HOURS PRN
Status: DISCONTINUED | OUTPATIENT
Start: 2018-04-10 | End: 2018-04-10 | Stop reason: HOSPADM

## 2018-04-10 RX ORDER — FLUTICASONE PROPIONATE 50 MCG
1 SPRAY, SUSPENSION (ML) NASAL DAILY
Status: DISCONTINUED | OUTPATIENT
Start: 2018-04-10 | End: 2018-04-10 | Stop reason: HOSPADM

## 2018-04-10 RX ORDER — ALBUTEROL SULFATE 90 UG/1
2 AEROSOL, METERED RESPIRATORY (INHALATION) EVERY 4 HOURS PRN
Status: CANCELLED | OUTPATIENT
Start: 2018-04-10

## 2018-04-10 RX ORDER — DONEPEZIL HYDROCHLORIDE 5 MG/1
10 TABLET, FILM COATED ORAL
Status: DISCONTINUED | OUTPATIENT
Start: 2018-04-10 | End: 2018-04-10 | Stop reason: HOSPADM

## 2018-04-10 RX ORDER — ASPIRIN 81 MG/1
81 TABLET, CHEWABLE ORAL DAILY
Status: DISCONTINUED | OUTPATIENT
Start: 2018-04-10 | End: 2018-04-10 | Stop reason: HOSPADM

## 2018-04-10 RX ORDER — BUPROPION HYDROCHLORIDE 150 MG/1
150 TABLET ORAL DAILY
Status: DISCONTINUED | OUTPATIENT
Start: 2018-04-10 | End: 2018-04-10 | Stop reason: HOSPADM

## 2018-04-10 RX ORDER — BUSPIRONE HYDROCHLORIDE 10 MG/1
10 TABLET ORAL DAILY
Status: DISCONTINUED | OUTPATIENT
Start: 2018-04-10 | End: 2018-04-10 | Stop reason: HOSPADM

## 2018-04-10 RX ORDER — SACCHAROMYCES BOULARDII 250 MG
250 CAPSULE ORAL 2 TIMES DAILY
Status: DISCONTINUED | OUTPATIENT
Start: 2018-04-10 | End: 2018-04-10 | Stop reason: HOSPADM

## 2018-04-10 RX ORDER — ALBUTEROL SULFATE 2.5 MG/3ML
5 SOLUTION RESPIRATORY (INHALATION) ONCE
Status: COMPLETED | OUTPATIENT
Start: 2018-04-10 | End: 2018-04-10

## 2018-04-10 RX ORDER — FINASTERIDE 5 MG/1
5 TABLET, FILM COATED ORAL DAILY
Status: DISCONTINUED | OUTPATIENT
Start: 2018-04-10 | End: 2018-04-10 | Stop reason: HOSPADM

## 2018-04-10 RX ORDER — TAMSULOSIN HYDROCHLORIDE 0.4 MG/1
0.4 CAPSULE ORAL
Status: DISCONTINUED | OUTPATIENT
Start: 2018-04-10 | End: 2018-04-10 | Stop reason: HOSPADM

## 2018-04-10 RX ORDER — CYCLOSPORINE 0.5 MG/ML
1 EMULSION OPHTHALMIC EVERY 12 HOURS
Status: DISCONTINUED | OUTPATIENT
Start: 2018-04-10 | End: 2018-04-10 | Stop reason: HOSPADM

## 2018-04-10 RX ORDER — HYDRALAZINE HYDROCHLORIDE 20 MG/ML
5 INJECTION INTRAMUSCULAR; INTRAVENOUS EVERY 6 HOURS PRN
Status: DISCONTINUED | OUTPATIENT
Start: 2018-04-10 | End: 2018-04-10 | Stop reason: HOSPADM

## 2018-04-10 RX ADMIN — CARBIDOPA AND LEVODOPA 1 TABLET: 25; 100 TABLET ORAL at 19:17

## 2018-04-10 RX ADMIN — TAMSULOSIN HYDROCHLORIDE 0.4 MG: 0.4 CAPSULE ORAL at 16:47

## 2018-04-10 RX ADMIN — BUPROPION HYDROCHLORIDE 150 MG: 150 TABLET, FILM COATED, EXTENDED RELEASE ORAL at 19:16

## 2018-04-10 RX ADMIN — BUSPIRONE HYDROCHLORIDE 10 MG: 10 TABLET ORAL at 19:16

## 2018-04-10 RX ADMIN — Medication 250 MG: at 19:16

## 2018-04-10 RX ADMIN — FLUTICASONE PROPIONATE 1 SPRAY: 50 SPRAY, METERED NASAL at 19:17

## 2018-04-10 RX ADMIN — ALBUTEROL SULFATE 5 MG: 2.5 SOLUTION RESPIRATORY (INHALATION) at 10:31

## 2018-04-10 RX ADMIN — ENOXAPARIN SODIUM 40 MG: 40 INJECTION SUBCUTANEOUS at 16:47

## 2018-04-10 RX ADMIN — FINASTERIDE 5 MG: 5 TABLET, FILM COATED ORAL at 19:16

## 2018-04-10 RX ADMIN — IPRATROPIUM BROMIDE 0.5 MG: 0.5 SOLUTION RESPIRATORY (INHALATION) at 10:32

## 2018-04-10 NOTE — H&P
H&P- Letitia Hashimoto 1940, 69 y o  male MRN: [id number]    Unit/Bed#: GYN 1 Encounter: [id number]    Primary Care Provider: Jose Dudley MD   Date and time admitted to hospital: 4/10/2018  8:43 AM      * SOB (shortness of breath)   Assessment & Plan    ?related to ACS given subtle EKG changes; low suspicion given lack of exertional symptoms, negative troponin   Cont to trend troponins  Monitor with telemetry  EKG with 2nd troponin  Prn nebs for SOB            Lewy body dementia without behavioral disturbance   Assessment & Plan    Cont nuedexta (dextromethorphan/quinidine)           Parkinson's disease (HCC)   Assessment & Plan    Cont sinemet         BPH with obstruction/lower urinary tract symptoms   Assessment & Plan    cont tamsulosin   cont finasteride         Allergic rhinitis   Assessment & Plan    May be cause of primary problem  Supportive care           VTE Prophylaxis: Enoxaparin (Lovenox)  / sequential compression device   Code Status: FULL   POLST: POLST form is not discussed and not completed at this time  Anticipated Length of Stay:  Patient will be admitted on an Observation basis with an anticipated length of stay of  < 2 midnights  Justification for Hospital Stay: chest pain     Total Time for Visit, including Counseling / Coordination of Care: 45 minutes  Greater than 50% of this total time spent on direct patient counseling and coordination of care  Chief Complaint:   SOB     History of Present Illness:    Letitia Hashimoto  is a 69 y o  male h/o parkinson's disease and rickey body dementia who presents with SOB and productive cough, found to have abnormal EKG  Pt is poor historian so some history provided by wife at bedside  Had URI last month treated with z-naomi and had improvement  In the last few days more SOB with cough productive of yellow phlegm  No f/c  No new sick contacts  No chest pain now or prior to arrival  Denies chest pain/pressure or SOB with activity  No palpitations  Was due to be discharged from ED when EKG noted to be abnormal and changed from prior  Review of Systems:    Review of Systems   HENT: Negative  Respiratory: Positive for cough and shortness of breath  Cardiovascular: Negative for chest pain, palpitations and leg swelling  Musculoskeletal: Negative  Skin: Negative  All other systems reviewed and are negative  Past Medical and Surgical History:     Past Medical History:   Diagnosis Date    Asthma     Depression with anxiety     last assessed 12/5/14    [facility])        Past Surgical History:   Procedure Laterality Date    CATARACT EXTRACTION  2003    COLONOSCOPY  2002    complete    HERNIA REPAIR  1998    PROSTATE BIOPSY      needle bx       Meds/Allergies:    Prior to Admission medications    Medication Sig Start Date End Date Taking?  Authorizing Provider   ammonium lactate (LAC-HYDRIN) 12 % cream  1/10/18  Yes Historical Provider, MD   buPROPion (WELLBUTRIN XL) 150 mg 24 hr tablet Take 1 tablet by mouth daily   Yes Historical Provider, MD   busPIRone (BUSPAR) 10 mg tablet Take 1 tablet (10 mg total) by mouth daily for 90 days 4/2/18 7/1/18 Yes Vish Home, MD   carbidopa-levodopa (SINEMET)  mg per tablet TK 1 T PO QID 1/23/18  Yes Historical Provider, MD   Coenzyme Q10 (UBIDECARENONE) POWD Take 1 tablet by mouth   Yes Historical Provider, MD   cycloSPORINE (RESTASIS) 0 05 % ophthalmic emulsion Apply 1 drop to eye every 12 (twelve) hours 9/29/15  Yes Historical Provider, MD   dextromethorphan-quinidine (NUEDEXTA) 20-10 mg Take 1 capsule by mouth daily 4/5/17  Yes Historical Provider, MD   donepezil (ARICEPT) 10 mg tablet  1/29/18  Yes Historical Provider, MD   DYMISTA 137-50 MCG/ACT SUSP INSTIL 1 SPRAY IN EACH NOSTRIL TWICE A DAY AS NEEDED 1/23/18  Yes Historical Provider, MD   finasteride (PROSCAR) 5 mg tablet Take 1 tablet by mouth daily 11/11/16  Yes Historical Provider, MD   Multiple Vitamins-Minerals (CENTRUM SILVER 50+MEN PO) Take 1 tablet by mouth daily   Yes Historical Provider, MD   QVAR 80 MCG/ACT inhaler INHALE 2 PUFFS PO  TWICE A DAY 1/9/18  Yes Historical Provider, MD   saccharomyces boulardii (FLORASTOR) 250 mg capsule Take 1 tablet by mouth   Yes Historical Provider, MD   tamsulosin (FLOMAX) 0 4 mg Take 1 tablet by mouth daily 5/26/16  Yes Historical Provider, MD   triamcinolone (KENALOG) 0 1 % lotion Apply topically 3 (three) times a day 8/22/16  Yes Historical Provider, MD     I have reviewed home medications with patient personally  Allergies: No Known Allergies    Social History:     Marital Status: /Civil Union     Patient Pre-hospital Living Situation: home with wife   Patient Pre-hospital Level of Mobility: some baseline gait abnormalities  Patient Pre-hospital Diet Restrictions: none  Substance Use History:   History   Alcohol Use    Yes     Comment: social     History   Smoking Status    Never Smoker   Smokeless Tobacco    Never Used     History   Drug Use No       Family History:    non-contributory    Physical Exam:     Vitals:   Blood Pressure: (!) 176/91 (04/10/18 1313)  Pulse: 77 (04/10/18 1313)  Temperature: 98 2 °F (36 8 °C) (04/10/18 0807)  Temp Source: Oral (04/10/18 0807)  Respirations: 18 (04/10/18 1313)  Height: 5' 9" (175 3 cm) (04/10/18 0807)  Weight - Scale: 79 8 kg (176 lb) (04/10/18 0807)  SpO2: 95 % (04/10/18 1313)    Physical Exam   HENT:   Head: Normocephalic and atraumatic  Cardiovascular: Normal rate and regular rhythm  No murmur heard  Pulmonary/Chest: He has wheezes (mild exp wheeze in RLL)  Abdominal: Soft  Bowel sounds are normal  He exhibits no distension  There is no tenderness  Musculoskeletal: Normal range of motion  He exhibits no edema or tenderness  Neurological: He is alert  No cranial nerve deficit  He exhibits abnormal muscle tone  Oriented to person and place; answers yes/no questions appropriately    Skin: Skin is dry  No rash noted   No erythema  Psychiatric: He has a normal mood and affect  Additional Data:     Lab Results: I have personally reviewed pertinent reports  Results from last 7 days  Lab Units 04/10/18  1036   WBC Thousand/uL 7 54   HEMOGLOBIN g/dL 16 4   HEMATOCRIT % 47 8   PLATELETS Thousands/uL 170   NEUTROS PCT % 67   LYMPHS PCT % 21   MONOS PCT % 9   EOS PCT % 3       Results from last 7 days  Lab Units 04/10/18  1036   SODIUM mmol/L 139   POTASSIUM mmol/L 4 0   CHLORIDE mmol/L 107   CO2 mmol/L 28   BUN mg/dL 12   CREATININE mg/dL 1 03   CALCIUM mg/dL 9 1   GLUCOSE RANDOM mg/dL 94           Imaging: I have personally reviewed pertinent reports  Xr Chest 2 Views    Result Date: 4/10/2018  Narrative: CHEST INDICATION:   CP/SOB/cough  Productive cough and shortness of breath for 2 days  COMPARISON:  Chest radiographs March 8, 2018 EXAM PERFORMED/VIEWS:  XR CHEST PA & LATERAL  The frontal view was performed utilizing dual energy radiographic technique  Images: 5 FINDINGS: The heart is enlarged  Atherosclerotic changes in the aorta  Lung volumes diminished  Elevation of right hemidiaphragm  Osseous structures appear within normal limits for patient age  Impression: No acute cardiopulmonary disease  Workstation performed: [id number]       EKG, Pathology, and Other Studies Reviewed on Admission:   · EKG: NSR 71   TWI in V2-V3; subtle STD in same    Allscripts / Epic Records Reviewed: Yes     ** Please Note: This note has been constructed using a voice recognition system   **

## 2018-04-10 NOTE — MEDICAL STUDENT
H&P Exam - Ena Smoke  69 y o  male MRN: [id number]    Unit/Bed#: GYN 1 Encounter: [id number]    Assessment:  Principal problem:   shortness of breath    Active problems:  Parkinson's disease  Lewy body dementia  BPH  Overactive bladder  Asthma  Allergic rhinitis    Plan:  1  Shortness of breath: improving, pt does not complains of sob at the moment  Pt received albuterol and ipratroprium neb in the ED  Continue monitor vitals  Continue to trend troponin to rule out cardiac event  2  Parkinson's disease: on carbidopa-levodopa 25-100mg QID, donepezil 10 Hs  Continue home meds  Pt is following neurology as outpatient  3  Lewy body dementia: on carbidopa-levodopa 25-100mg QID, donepezil 10 Hs  Continue home meds  Pt is following neurology as outpatient  4  BPH: on tamsulosin 0 1 daily, Finasteride 5mg daily  Continue home meds, f/u outpatient    5  Overactive bladder: pt uses adult diaper at baseline  F/u outpatient  6  Extrinsic Asthma: no wheezes on exam  Order albuterol inhaler prn Q4  Monitor vitals     7  Allergic rhinitis: Dymista BID prn  F/u outpatient  Disposition: if troponin x3 negative, and no worsening of SOB, pt can be discharged  History of Present Illness   69 y o  Male with PMHx of ron body dementia and parkinson's disease presents with shortness of breath  Pt is a poor historian due to his dementia; his wife is at bedside to provide information  This episode started on the morning of 4/10/18 when his wife was walking him; the pt stated he feel more short of breath compared to baseline  Pt had an episode of URI that was treated with z-naomi about 1 month ago  Pt does not use any oxygen at baseline, no SOB with exertion as baseline  Pt does use QVAR inhaler for allergic rhinitis  He denies any fever, chills, nausea, vomiting, chest pain, constipation, diarrhea and altered mental status  No recent changes in medication, sick contact or travel   Pt denies any history of blood clots, strokes, dvts, CHF, heart attacks, diabetes or cancer diagnosis/treatment  Pt received 1 time albuterol 5mg neb and ipratropium 0 5mg neb at the ED  Limited ROS, obtained from wife  Review of Systems   Constitutional: Negative for activity change, chills, diaphoresis, fatigue and fever  ____  HENT: Positive for postnasal drip  Negative for congestion, rhinorrhea, sinus pain, sinus pressure, sore throat, trouble swallowing and voice change  ____  Eyes: Negative for visual disturbance  ____  Respiratory: Positive for cough  Negative for apnea, choking, chest tightness, shortness of breath, wheezing and stridor  ____  Cardiovascular: Negative for chest pain, palpitations and leg swelling  ____  Gastrointestinal: Negative for abdominal distention, abdominal pain, constipation, diarrhea, nausea and vomiting  ________  Genitourinary: Positive for frequency and urgency  Uses a diaper at baseline ____  Musculoskeletal: Positive for gait problem  Negative for arthralgias, joint swelling, neck pain and neck stiffness  Small, shuffling gait ____  Skin: Negative for color change, pallor, rash and wound  ________  Neurological: Negative for dizziness, light-headedness, numbness and headaches  Historical Information   Past Medical History:   Diagnosis Date    Asthma     Depression with anxiety     last assessed 12/5/14    [facility])      Past Surgical History:   Procedure Laterality Date    CATARACT EXTRACTION  2003    COLONOSCOPY  2002    complete    HERNIA REPAIR  1998    PROSTATE BIOPSY      needle bx     Social History   History   Alcohol Use    Yes     Comment: social     History   Drug Use No     History   Smoking Status    Never Smoker   Smokeless Tobacco    Never Used     Family History: non-contributory    Meds/Allergies   No Known Allergies    No current facility-administered medications on file prior to encounter        Current Outpatient Prescriptions on File Prior to Encounter   Medication Sig Dispense Refill    ammonium lactate (LAC-HYDRIN) 12 % cream   1    buPROPion (WELLBUTRIN XL) 150 mg 24 hr tablet Take 1 tablet by mouth daily      busPIRone (BUSPAR) 10 mg tablet Take 1 tablet (10 mg total) by mouth daily for 90 days 90 tablet 1    carbidopa-levodopa (SINEMET)  mg per tablet TK 1 T PO QID  0    Coenzyme Q10 (UBIDECARENONE) POWD Take 1 tablet by mouth      cycloSPORINE (RESTASIS) 0 05 % ophthalmic emulsion Apply 1 drop to eye every 12 (twelve) hours      dextromethorphan-quinidine (NUEDEXTA) 20-10 mg Take 1 capsule by mouth daily      donepezil (ARICEPT) 10 mg tablet   3    DYMISTA 137-50 MCG/ACT SUSP INSTIL 1 SPRAY IN EACH NOSTRIL TWICE A DAY AS NEEDED  5    finasteride (PROSCAR) 5 mg tablet Take 1 tablet by mouth daily      Multiple Vitamins-Minerals (CENTRUM SILVER 50+MEN PO) Take 1 tablet by mouth daily      QVAR 80 MCG/ACT inhaler INHALE 2 PUFFS PO  TWICE A DAY  5    saccharomyces boulardii (FLORASTOR) 250 mg capsule Take 1 tablet by mouth      tamsulosin (FLOMAX) 0 4 mg Take 1 tablet by mouth daily      triamcinolone (KENALOG) 0 1 % lotion Apply topically 3 (three) times a day         Objective   First Vitals:   Blood Pressure: (!) 185/89 (04/10/18 0807)  Pulse: 75 (04/10/18 0807)  Temperature: 98 2 °F (36 8 °C) (04/10/18 0807)  Temp Source: Oral (04/10/18 0807)  Respirations: 18 (04/10/18 0807)  Height: 5' 9" (175 3 cm) (04/10/18 0807)  Weight - Scale: 79 8 kg (176 lb) (04/10/18 0807)  SpO2: 95 % (04/10/18 0807)    Current Vitals:   Blood Pressure: 170/84 (04/10/18 1130)  Pulse: 68 (04/10/18 1130)  Temperature: 98 2 °F (36 8 °C) (04/10/18 0807)  Temp Source: Oral (04/10/18 0807)  Respirations: 17 (04/10/18 1130)  Height: 5' 9" (175 3 cm) (04/10/18 0807)  Weight - Scale: 79 8 kg (176 lb) (04/10/18 0807)  SpO2: 93 % (04/10/18 1130)    No intake or output data in the 24 hours ending 04/10/18 1232    Invasive Devices          No matching active lines, drains, or airways          Physical Exam  Constitutional: He is oriented to person, place, and time  He appears well-developed and well-nourished  No distress  HENT:   Head: Normocephalic and atraumatic  Nose: Nose normal    Mouth/Throat: Oropharynx is clear and moist    White secretion vs scar tissue seen in back of the oropharynx   No rhinorrhea, cubble stoning, swelling in the nares  Eyes: EOM are normal  Pupils are equal, round, and reactive to light  Neck: Normal range of motion  Neck supple  No JVD present  Cardiovascular: Normal rate, regular rhythm, normal heart sounds and intact distal pulses  Exam reveals no gallop and no friction rub  No murmur heard  Pulmonary/Chest: Effort normal and breath sounds normal  No respiratory distress  He has no wheezes  He has no rales  He exhibits no tenderness  Abdominal: Bowel sounds are normal  He exhibits no distension  There is no tenderness  There is no gaurding  There is no rebound  Musculoskeletal: Normal range of motion  He exhibits no edema, tenderness or deformity  Neurological: He is alert and oriented to person, place, and time  Skin: Skin is warm and dry  Capillary refill takes less than 2 seconds  No rash noted  No erythema  No pallor  Psychiatric: He has a normal mood and affect  Lab Results:   Results for Saumya Bhakta (MRN [id number]) as of 4/10/2018 13:00   Ref  Range 4/10/2018 10:36   Sodium Latest Ref Range: 136 - 145 mmol/L 139   Potassium Latest Ref Range: 3 5 - 5 3 mmol/L 4 0   Chloride Latest Ref Range: 100 - 108 mmol/L 107   CO2 Latest Ref Range: 21 - 32 mmol/L 28   Anion Gap Latest Ref Range: 4 - 13 mmol/L 4   BUN Latest Ref Range: 5 - 25 mg/dL 12   Creatinine Latest Ref Range: 0 60 - 1 30 mg/dL 1 03   Glucose Latest Ref Range: 65 - 140 mg/dL 94   Calcium Latest Units: mg/dL 9 1     Results for Saumya Bhakta (MRN [id number]) as of 4/10/2018 13:00   Ref   Range 4/10/2018 10:36   WBC Latest Ref Range: 4 31 - 10 16 Thousand/uL 7 54   RBC Latest Ref Range: 3 88 - 5 62 Million/uL 4 74   Hemoglobin Latest Ref Range: 12 0 - 17 0 g/dL 16 4   Hematocrit Latest Ref Range: 36 5 - 49 3 % 47 8   MCV Latest Ref Range: 82 - 98 fL 101 (H)   MCH Latest Ref Range: 26 8 - 34 3 pg 34 6 (H)   MCHC Latest Ref Range: 31 4 - 37 4 g/dL 34 3   RDW Latest Ref Range: 11 6 - 15 1 % 13 2   Platelets Latest Ref Range: 149 - 390 Thousands/uL 170   MPV Latest Ref Range: 8 9 - 12 7 fL 11 0     Results for Brianna Posadas (MRN [id number]) as of 4/10/2018 13:00   Ref   Range 4/10/2018 10:36   Troponin I Latest Ref Range: <=0 04 ng/mL <0 02   NT-proBNP Latest Ref Range: <450 pg/mL 237       Imaging:   XR chest pa and lateral 4/10/18  No acute cardiopulmonary disease      EKG, Pathology, and Other Studies:   EKG x2   4/10/18 0946: atrial fibrillation, right bundle branch block, left anterior fascicular block  4/10/18 1143: normal sinus rhythm replaced atrial fibrillation, right bundle branch block, left anterior fascicular block

## 2018-04-10 NOTE — ED PROVIDER NOTES
History  Chief Complaint   Patient presents with    Shortness of Breath     Patient with cough and shortness of breath  Had similar symptoms a couple of weeks ago, had a chest xray, which was good  Patient started again 2 days ago with worsening cough and yellow mucus production  70 yo M presenting with cough/SOB  Pt is a poor historian given history of dementia and history is mainly from wife who is at bedside  Wife repots that approximately 1 month ago he was treated for possible PNA with a  Z-naomi, somewhat got better, but now with recurrent cough  Cough is occasionally productive, white/yellow sputum  Not worse at night  Pt complained of SOB to his wife this morning and this is what prompted her to bring him to the ED today  Wife gave him his albuterol inhaler at home with morning  Pt denies any sx at this times  Denies orthopnea/PND/CP/leg swelling/pain  History of Lewy-Body dementia  No known CAD  Possible history of asthma, recently told this by allergist and has albuterol inhaler to use at home  Never a smoker  Lives at home with wife  A/P: 70 yo M with SOB/cough- will give duoneb, EKG to assess for STEMI/arrhythmia, troponin to evaluate for ischemia, CBC to assess for leukocytosis/anemia, BMP to assess renal function/lytes, CXR to r/o PNA/effusion/CHF, reassess, dispo accordingly            Prior to Admission Medications   Prescriptions Last Dose Informant Patient Reported? Taking?    Coenzyme Q10 (UBIDECARENONE) POWD 4/9/2018 at Unknown time Self Yes Yes   Sig: Take 1 tablet by mouth   DYMISTA 137-50 MCG/ACT SUSP 4/9/2018 at Unknown time Self Yes Yes   Sig: INSTIL 1 SPRAY IN EACH NOSTRIL TWICE A DAY AS NEEDED   Multiple Vitamins-Minerals (CENTRUM SILVER 50+MEN PO) 4/9/2018 at Unknown time Self Yes Yes   Sig: Take 1 tablet by mouth daily   QVAR 80 MCG/ACT inhaler 4/9/2018 at Unknown time Self Yes Yes   Sig: INHALE 2 PUFFS PO  TWICE A DAY   ammonium lactate (LAC-HYDRIN) 12 % cream 4/9/2018 at Unknown time Self Yes Yes   buPROPion (WELLBUTRIN XL) 150 mg 24 hr tablet 4/9/2018 at Unknown time Self Yes Yes   Sig: Take 1 tablet by mouth daily   busPIRone (BUSPAR) 10 mg tablet 4/9/2018 at Unknown time  No Yes   Sig: Take 1 tablet (10 mg total) by mouth daily for 90 days   carbidopa-levodopa (SINEMET)  mg per tablet 4/9/2018 at Unknown time Self Yes Yes   Sig: TK 1 T PO QID   cycloSPORINE (RESTASIS) 0 05 % ophthalmic emulsion 4/9/2018 at Unknown time Self Yes Yes   Sig: Apply 1 drop to eye every 12 (twelve) hours   dextromethorphan-quinidine (NUEDEXTA) 20-10 mg 4/9/2018 at Unknown time Self Yes Yes   Sig: Take 1 capsule by mouth daily   donepezil (ARICEPT) 10 mg tablet 4/9/2018 at Unknown time Self Yes Yes   finasteride (PROSCAR) 5 mg tablet 4/9/2018 at Unknown time Self Yes Yes   Sig: Take 1 tablet by mouth daily   saccharomyces boulardii (FLORASTOR) 250 mg capsule 4/9/2018 at Unknown time Self Yes Yes   Sig: Take 1 tablet by mouth   tamsulosin (FLOMAX) 0 4 mg 4/9/2018 at Unknown time Self Yes Yes   Sig: Take 1 tablet by mouth daily   triamcinolone (KENALOG) 0 1 % lotion 4/9/2018 at Unknown time Self Yes Yes   Sig: Apply topically 3 (three) times a day      Facility-Administered Medications: None       Past Medical History:   Diagnosis Date    Asthma     Depression with anxiety     last assessed 12/5/14    [facility])        Past Surgical History:   Procedure Laterality Date    CATARACT EXTRACTION  2003    COLONOSCOPY  2002    complete    HERNIA REPAIR  1998    PROSTATE BIOPSY      needle bx       Family History   Problem Relation Age of Onset    Coronary artery disease Father     Stroke Father      I have reviewed and agree with the history as documented      Social History   Substance Use Topics    Smoking status: Never Smoker    Smokeless tobacco: Never Used    Alcohol use Yes      Comment: social        Review of Systems   Constitutional: Negative for chills, fever and unexpected weight change  HENT: Negative for ear pain, rhinorrhea and sore throat  Respiratory: Positive for cough and shortness of breath  Cardiovascular: Negative for chest pain and leg swelling  Gastrointestinal: Negative for abdominal pain, constipation, diarrhea, nausea and vomiting  Genitourinary: Negative for dysuria, frequency, hematuria and urgency  Musculoskeletal: Negative for back pain, myalgias and neck pain  Skin: Negative for color change and rash  Allergic/Immunologic: Negative for environmental allergies and immunocompromised state  Neurological: Negative for dizziness, weakness, light-headedness, numbness and headaches  Hematological: Negative for adenopathy  Does not bruise/bleed easily  Psychiatric/Behavioral: Negative for agitation and confusion  All other systems reviewed and are negative  Physical Exam  ED Triage Vitals [04/10/18 0807]   Temperature Pulse Respirations Blood Pressure SpO2   98 2 °F (36 8 °C) 75 18 (!) 185/89 95 %      Temp Source Heart Rate Source Patient Position - Orthostatic VS BP Location FiO2 (%)   Oral Monitor Sitting Right arm --      Pain Score       No Pain           Orthostatic Vital Signs  Vitals:    04/10/18 1030 04/10/18 1130 04/10/18 1313 04/10/18 1500   BP: (!) 190/96 170/84 (!) 176/91 140/88   Pulse: 64 68 77 62   Patient Position - Orthostatic VS: Lying   Lying       Physical Exam   Constitutional: He is oriented to person, place, and time  He appears well-developed and well-nourished  HENT:   Head: Normocephalic and atraumatic  Mouth/Throat: Oropharynx is clear and moist    Eyes: Conjunctivae and EOM are normal    Neck: Normal range of motion  Neck supple  Cardiovascular: Normal rate, regular rhythm and normal heart sounds  Pulmonary/Chest: Effort normal  No respiratory distress  He has wheezes  He has no rales  He exhibits no tenderness  Abdominal: Soft  He exhibits no distension  There is no tenderness     Musculoskeletal: He exhibits no edema or deformity  No calf swelling/ttp  No peripheral edema   Neurological: He is alert and oriented to person, place, and time  He exhibits normal muscle tone  Coordination normal    Skin: Skin is warm and dry  No rash noted  Psychiatric: He has a normal mood and affect  Thought content normal    Nursing note and vitals reviewed        ED Medications  Medications   buPROPion (WELLBUTRIN XL) 24 hr tablet 150 mg (not administered)   busPIRone (BUSPAR) tablet 10 mg (not administered)   carbidopa-levodopa (SINEMET)  mg per tablet 1 tablet (not administered)   cycloSPORINE (RESTASIS) 0 05 % ophthalmic emulsion 1 drop (1 drop Ophthalmic Not Given 4/10/18 1634)   dextromethorphan-quinidine (NUEDEXTA) 20-10 mg per capsule 1 capsule (1 capsule Oral Not Given 4/10/18 1635)   donepezil (ARICEPT) tablet 10 mg (not administered)   fluticasone (FLONASE) 50 mcg/act nasal spray 1 spray (not administered)   finasteride (PROSCAR) tablet 5 mg (not administered)   saccharomyces boulardii (FLORASTOR) capsule 250 mg (not administered)   tamsulosin (FLOMAX) capsule 0 4 mg (0 4 mg Oral Given 4/10/18 1647)   enoxaparin (LOVENOX) subcutaneous injection 40 mg (40 mg Subcutaneous Given 4/10/18 1647)   acetaminophen (TYLENOL) tablet 650 mg (not administered)   aspirin chewable tablet 81 mg (not administered)   hydrALAZINE (APRESOLINE) injection 5 mg (not administered)   albuterol inhalation solution 2 5 mg (not administered)   albuterol inhalation solution 5 mg (5 mg Nebulization Given 4/10/18 1031)   ipratropium (ATROVENT) 0 02 % inhalation solution 0 5 mg (0 5 mg Nebulization Given 4/10/18 1032)       Diagnostic Studies  Results Reviewed     Procedure Component Value Units Date/Time    Basic metabolic panel [[id number]] Collected:  04/10/18 1036    Lab Status:  Final result Specimen:  Blood from Arm, Left Updated:  04/10/18 1107     Sodium 139 mmol/L      Potassium 4 0 mmol/L      Chloride 107 mmol/L      CO2 28 mmol/L      Anion Gap 4 mmol/L      BUN 12 mg/dL      Creatinine 1 03 mg/dL      Glucose 94 mg/dL      Calcium 9 1 mg/dL      eGFR 70 ml/min/1 73sq m     Narrative:         National Kidney Disease Education Program recommendations are as follows:  GFR calculation is accurate only with a steady state creatinine  Chronic Kidney disease less than 60 ml/min/1 73 sq  meters  Kidney failure less than 15 ml/min/1 73 sq  meters  NT-BNP PRO [[id number]]  (Normal) Collected:  04/10/18 1036    Lab Status:  Final result Specimen:  Blood from Arm, Left Updated:  04/10/18 1107     NT-proBNP 237 pg/mL     Troponin I [[id number]]  (Normal) Collected:  04/10/18 1036    Lab Status:  Final result Specimen:  Blood from Arm, Left Updated:  04/10/18 1105     Troponin I <0 02 ng/mL     Narrative:         [employer] Chemistry analyzer 99% cutoff is > 0 04 ng/mL in network labs    o cTnI 99% cutoff is useful only when applied to patients in the clinical setting of myocardial ischemia  o cTnI 99% cutoff should be interpreted in the context of clinical history, ECG findings and possibly cardiac imaging to establish correct diagnosis  o cTnI 99% cutoff may be suggestive but clearly not indicative of a coronary event without the clinical setting of myocardial ischemia      CBC and differential [[id number]]  (Abnormal) Collected:  04/10/18 1036    Lab Status:  Final result Specimen:  Blood from Arm, Left Updated:  04/10/18 1051     WBC 7 54 Thousand/uL      RBC 4 74 Million/uL      Hemoglobin 16 4 g/dL      Hematocrit 47 8 %       (H) fL      MCH 34 6 (H) pg      MCHC 34 3 g/dL      RDW 13 2 %      MPV 11 0 fL      Platelets 195 Thousands/uL      nRBC 0 /100 WBCs      Neutrophils Relative 67 %      Lymphocytes Relative 21 %      Monocytes Relative 9 %      Eosinophils Relative 3 %      Basophils Relative 0 %      Neutrophils Absolute 5 08 Thousands/µL      Lymphocytes Absolute 1 57 Thousands/µL      Monocytes Absolute 0 64 Thousand/µL      Eosinophils Absolute 0 22 Thousand/µL      Basophils Absolute 0 02 Thousands/µL                  XR chest 2 views   ED Interpretation by Alberto Chase DO (04/10 1128)   Interpreted by myself: no acute abn      Final Result by Carol Landry DO (04/10 1012)      No acute cardiopulmonary disease  Workstation performed: [id number]               Procedures  ECG 12 Lead Documentation  Date/Time: 4/10/2018 9:56 AM  Performed by: Nury Zarate  Authorized by: Merlene Higgins     Indications / Diagnosis:  SOB  Rate:     ECG rate:  69  Rhythm:     Rhythm: sinus rhythm    Ectopy:     Ectopy: PAC    QRS:     QRS axis:  Left  Conduction:     Conduction: normal    ST segments:     ST segments:  Non-specific    Depression:  V2 and V3            Phone Consults  ED Phone Contact    ED Course  ED Course as of Apr 10 1749   Tue Apr 10, 2018   1145 Repeat EKG @ 11:43- NSR @ 71  LAD  RBBB       1206 scaica          HEART Risk Score    Flowsheet Row Most Recent Value   History  0 Filed at: 04/10/2018 1112   ECG  0 Filed at: 04/10/2018 1112   Age  2 Filed at: 04/10/2018 1112   Risk Factors  1 Filed at: 04/10/2018 1112   Troponin  0 Filed at: 04/10/2018 1112   Heart Score Risk Calculator   History  0 Filed at: 04/10/2018 1112   ECG  0 Filed at: 04/10/2018 1112   Age  2 Filed at: 04/10/2018 1112   Risk Factors  1 Filed at: 04/10/2018 1112   Troponin  0 Filed at: 04/10/2018 1112   HEART Score  3 Filed at: 04/10/2018 1112   HEART Score  3 Filed at: 04/10/2018 1112        Identification of Seniors at [facility] Most Recent Value   (ISAR) Identification of Seniors at Risk   Before the illness or injury that brought you to the Emergency, did you need someone to help you on a regular basis? 1 Filed at: 04/10/2018 0809   In the last 24 hours, have you needed more help than usual?  0 Filed at: 04/10/2018 3730   Have you been hospitalized for one or more nights during the past 6 months?   0 Filed at: 04/10/2018 0809   In general, do you see well?  0 Filed at: 04/10/2018 0809   In general, do you have serious problems with your memory? 0 Filed at: 04/10/2018 1421   Do you take more than three different medications every day? 1 Filed at: 04/10/2018 0809   ISAR Score  2 Filed at: 04/10/2018 0809                          MDM  Number of Diagnoses or Management Options  Cough:   Dyspnea:   EKG abnormalities:   Diagnosis management comments: 70 yo M with cough/SOB, new changes on EKG, therefore admitted to [address] [place] [address] for ACS r/o       Amount and/or Complexity of Data Reviewed  Clinical lab tests: ordered and reviewed  Tests in the radiology section of CPT®: ordered and reviewed  Tests in the medicine section of CPT®: ordered and reviewed      CritCare Time    Disposition  Final diagnoses:   Cough   Dyspnea   EKG abnormalities     Time reflects when diagnosis was documented in both MDM as applicable and the Disposition within this note     Time User Action Codes Description Comment    4/10/2018 11:28 AM Cook Kida A Add [R05] Cough     4/10/2018 11:28 AM Cook Kida A Add [R06 02] Shortness of breath     4/10/2018 11:28 AM Cook Kida A Remove [R06 02] Shortness of breath     4/10/2018 12:06 PM Cook Kida A Add [R06 00] Dyspnea     4/10/2018 12:06 PM Cook Kida A Add [R94 31] EKG abnormalities     4/10/2018 12:06 PM Paul Levi Modify [R05] Cough     4/10/2018 12:06 PM Cook Kida A Modify [R06 00] Dyspnea       ED Disposition     ED Disposition Condition Comment    Admit  Yassine Stewart  discharge to home/self care  Condition at discharge: Stable        Follow-up Information     Follow up With Specialties Details [address] , [address]  [address] [facility] [place] [address]  [phone]          Return to ED if you have any new/worsening symptoms  Call PCP to follow up  Use albuterol at home every 4 hours as needed          Current Discharge Medication List      CONTINUE these medications which have NOT CHANGED    Details   ammonium lactate (LAC-HYDRIN) 12 % cream Refills: 1      buPROPion (WELLBUTRIN XL) 150 mg 24 hr tablet Take 1 tablet by mouth daily      busPIRone (BUSPAR) 10 mg tablet Take 1 tablet (10 mg total) by mouth daily for 90 days  Qty: 90 tablet, Refills: 1    Associated Diagnoses: Depression with anxiety      carbidopa-levodopa (SINEMET)  mg per tablet TK 1 T PO QID  Refills: 0      Coenzyme Q10 (UBIDECARENONE) POWD Take 1 tablet by mouth      cycloSPORINE (RESTASIS) 0 05 % ophthalmic emulsion Apply 1 drop to eye every 12 (twelve) hours      dextromethorphan-quinidine (NUEDEXTA) 20-10 mg Take 1 capsule by mouth daily      donepezil (ARICEPT) 10 mg tablet Refills: 3      DYMISTA 137-50 MCG/ACT SUSP INSTIL 1 SPRAY IN EACH NOSTRIL TWICE A DAY AS NEEDED  Refills: 5      finasteride (PROSCAR) 5 mg tablet Take 1 tablet by mouth daily      Multiple Vitamins-Minerals (CENTRUM SILVER 50+MEN PO) Take 1 tablet by mouth daily      QVAR 80 MCG/ACT inhaler INHALE 2 PUFFS PO  TWICE A DAY  Refills: 5      saccharomyces boulardii (FLORASTOR) 250 mg capsule Take 1 tablet by mouth      tamsulosin (FLOMAX) 0 4 mg Take 1 tablet by mouth daily      triamcinolone (KENALOG) 0 1 % lotion Apply topically 3 (three) times a day           No discharge procedures on file  ED Provider  Attending physically available and evaluated Mohan Peñamiranda LOZANO managed the patient along with the ED Attending      Electronically Signed by         Ana Weiner DO  04/10/18 2619

## 2018-04-10 NOTE — ED NOTES
Patient rang bell  Patient inquiring about admission and why  Patient reassured of reason why admission was placed and updated on bed status and offered lunch  Will order lunch when diet is entered        Kurt Armas RN  04/10/18 4480

## 2018-04-10 NOTE — ED ATTENDING ATTESTATION
Damgar Malcolm MD, saw and evaluated the patient  I have discussed the patient with the resident/non-physician practitioner and agree with the resident's/non-physician practitioner's findings, Plan of Care, and MDM as documented in the resident's/non-physician practitioner's note, except where noted  All available labs and Radiology studies were reviewed  At this point I agree with the current assessment done in the Emergency Department  I have conducted an independent evaluation of this patient a history and physical is as follows:    Patient reports that approximately 1 month ago he was seen by his family doctor for a cough and was diagnosed with bronchitis and given azithromycin  The patient finished azithromycin and his cough improved but the patient has continued to have occasional coughs  Today the patient complained of mild dyspnea and so the wife brought him to the emergency department  The patient states that his allergist his told him he has asthma and the patient has been prescribed an albuterol inhaler in the past   The patient denies any chest abdomen or back pain  There is no neck arm or jaw pain  The patient denied diaphoresis or nausea  The patient denies any exertional symptoms  Physical exam demonstrates a pleasant alert nontoxic male in no acute distress  HEENT exam is normal   Lungs had mild expiratory wheezes without rales  The heart had a regular rate rhythm abdomen is soft and nontender  Extremities were symmetric and nontender  There is no peripheral edema  There is no focal neurologic deficit  Skin had no rash    Critical Care Time  CritCare Time    Procedures

## 2018-04-10 NOTE — DISCHARGE SUMMARY
Discharge Summary - [facility]    Patient Information: Johnnie Rodriguez 69 y o  male MRN: [id number]  Unit/Bed#: CW2 211-02 Encounter: [id number]    Discharging Physician / Practitioner: Kelly Brady MD  PCP: Steven Tang MD  Admission Date: 4/10/2018  Discharge Date: 04/10/18    Disposition:     Home    Reason for Admission: SOB     Discharge Diagnoses:     Principal Problem:    SOB (shortness of breath)  Active Problems:    Lewy body dementia without behavioral disturbance    Parkinson's disease ([address] )    BPH with obstruction/lower urinary tract symptoms    Allergic rhinitis  Resolved Problems:    * No resolved hospital problems  *      Consultations During Hospital Stay:  · none    Procedures Performed:     · none    Significant Findings / Test Results:     · Troponin negative x3     Incidental Findings:   · none     Test Results Pending at Discharge (will require follow up):   · none     Outpatient Tests Requested:  · None     Complications:  None     Hospital Course:     Johnnie Rodriguez  is a 69 y o  male patient who originally presented to the hospital on 4/10/2018 due to shortness of breath with productive cough  Patient did have a troponin checked on admission which was negative however his EKG did have subtle ST depressions  However these were less than 1 mm  Patient was admitted under observation; his troponins were trended while he was observed on tele  There were no arrhythmias noted  Pt had no chest pain or pressure and his SOB improved with nebs in the ED  Given the atypical nature and nonspecific EKG changes, I do no suspect any cardiac pathology at this time  Condition at Discharge: fair     Discharge Day Visit / Exam:     Subjective:  Feels well   No chest pain   Vitals: Blood Pressure: 148/88 (04/10/18 1901)  Pulse: 72 (04/10/18 1901)  Temperature: 97 8 °F (36 6 °C) (04/10/18 1500)  Temp Source: Oral (04/10/18 1500)  Respirations: 18 (04/10/18 1901)  Height: 5' 8" (172 7 cm) (04/10/18 1500)  Weight - Scale: 79 9 kg (176 lb 3 2 oz) (04/10/18 1500)  SpO2: 95 % (04/10/18 1901)  Exam:   Physical Exam   Constitutional: No distress  HENT:   Head: Normocephalic and atraumatic  Cardiovascular: Normal rate and regular rhythm  Pulmonary/Chest: Effort normal and breath sounds normal  No respiratory distress  He has no wheezes  Abdominal: Soft  Bowel sounds are normal  He exhibits no distension  There is no tenderness  There is no rebound  Musculoskeletal: He exhibits no edema or tenderness  Neurological: He is alert  Skin: Skin is warm and dry  He is not diaphoretic  Psychiatric: He has a normal mood and affect  His behavior is normal    Nursing note and vitals reviewed  Discussion with Family: wife at bedside     Discharge instructions/Information to patient and family:   See after visit summary for information provided to patient and family  Provisions for Follow-Up Care:  See after visit summary for information related to follow-up care and any pertinent home health orders  Planned Readmission: none     Discharge Statement:  I spent 30 minutes discharging the patient  This time was spent on the day of discharge  I had direct contact with the patient on the day of discharge  Greater than 50% of the total time was spent examining patient, answering all patient questions, arranging and discussing plan of care with patient as well as directly providing post-discharge instructions  Additional time then spent on discharge activities  Discharge Medications:  See after visit summary for reconciled discharge medications provided to patient and family        ** Please Note: This note has been constructed using a voice recognition system **

## 2018-04-10 NOTE — DISCHARGE INSTRUCTIONS
Acute Cough   WHAT YOU NEED TO KNOW:   An acute cough can last up to 3 weeks  Common causes of an acute cough include a cold, allergies, or a lung infection  DISCHARGE INSTRUCTIONS:   Return to the emergency department if:   · You have trouble breathing or feel short of breath  · You cough up blood, or you see blood in your mucus  · You faint or feel weak or dizzy  · You have chest pain when you cough or take a deep breath  · You have new wheezing  Contact your healthcare provider if:   · You have a fever  · Your cough lasts longer than 4 weeks  · Your symptoms do not improve with treatment  · You have questions or concerns about your condition or care  Medicines:   · Medicines  may be needed to stop the cough, decrease swelling in your airways, or help open your airways  Medicine may also be given to help you cough up mucus  Ask your healthcare provider what over-the-counter medicines you can take  If you have an infection caused by bacteria, you may need antibiotics  · Take your medicine as directed  Contact your healthcare provider if you think your medicine is not helping or if you have side effects  Tell him or her if you are allergic to any medicine  Keep a list of the medicines, vitamins, and herbs you take  Include the amounts, and when and why you take them  Bring the list or the pill bottles to follow-up visits  Carry your medicine list with you in case of an emergency  Manage your symptoms:   · Do not smoke and stay away from others who smoke  Nicotine and other chemicals in cigarettes and cigars can cause lung damage and make your cough worse  Ask your healthcare provider for information if you currently smoke and need help to quit  E-cigarettes or smokeless tobacco still contain nicotine  Talk to your healthcare provider before you use these products  · Drink extra liquids as directed  Liquids will help thin and loosen mucus so you can cough it up   Liquids will also help prevent dehydration  Examples of good liquids to drink include water, fruit juice, and broth  Do not drink liquids that contain caffeine  Caffeine can increase your risk for dehydration  Ask your healthcare provider how much liquid to drink each day  · Rest as directed  Do not do activities that make your cough worse, such as exercise  · Use a humidifier or vaporizer  Use a cool mist humidifier or a vaporizer to increase air moisture in your home  This may make it easier for you to breathe and help decrease your cough  · Eat 2 to 5 mL of honey 2 times each day  Honey can help thin mucus and decrease your cough  · Use cough drops or lozenges  These can help decrease throat irritation and your cough  Follow up with your healthcare provider as directed:  Write down your questions so you remember to ask them during your visits  © 2017 [employer] Information is for End User's use only and may not be sold, redistributed or otherwise used for commercial purposes  All illustrations and images included in CareNotes® are the copyrighted property of [employer]  or [employer] Khan  The above information is an  only  It is not intended as medical advice for individual conditions or treatments  Talk to your doctor, nurse or pharmacist before following any medical regimen to see if it is safe and effective for you

## 2018-04-10 NOTE — ASSESSMENT & PLAN NOTE
?related to ACS given subtle EKG changes; low suspicion given lack of exertional symptoms, negative troponin   Cont to trend troponins  Monitor with telemetry  EKG with 2nd troponin  Prn nebs for SOB

## 2018-04-11 LAB
ATRIAL RATE: 202 BPM
ATRIAL RATE: 267 BPM
ATRIAL RATE: 277 BPM
ATRIAL RATE: 73 BPM
P AXIS: 38 DEGREES
PR INTERVAL: 138 MS
QRS AXIS: -65 DEGREES
QRS AXIS: -67 DEGREES
QRS AXIS: -69 DEGREES
QRS AXIS: -70 DEGREES
QRSD INTERVAL: 144 MS
QRSD INTERVAL: 152 MS
QRSD INTERVAL: 154 MS
QRSD INTERVAL: 160 MS
QT INTERVAL: 426 MS
QT INTERVAL: 428 MS
QT INTERVAL: 430 MS
QT INTERVAL: 454 MS
QTC INTERVAL: 446 MS
QTC INTERVAL: 448 MS
QTC INTERVAL: 460 MS
QTC INTERVAL: 500 MS
T WAVE AXIS: 38 DEGREES
T WAVE AXIS: 64 DEGREES
T WAVE AXIS: 65 DEGREES
T WAVE AXIS: 73 DEGREES
VENTRICULAR RATE: 66 BPM
VENTRICULAR RATE: 66 BPM
VENTRICULAR RATE: 69 BPM
VENTRICULAR RATE: 73 BPM

## 2018-04-11 PROCEDURE — 93010 ELECTROCARDIOGRAM REPORT: CPT | Performed by: INTERNAL MEDICINE

## 2018-04-12 ENCOUNTER — OFFICE VISIT (OUTPATIENT)
Dept: FAMILY MEDICINE CLINIC | Facility: CLINIC | Age: 78
End: 2018-04-12
Payer: COMMERCIAL

## 2018-04-12 ENCOUNTER — TELEPHONE (OUTPATIENT)
Dept: FAMILY MEDICINE CLINIC | Facility: CLINIC | Age: 78
End: 2018-04-12

## 2018-04-12 VITALS
DIASTOLIC BLOOD PRESSURE: 76 MMHG | OXYGEN SATURATION: 92 % | HEIGHT: 68 IN | SYSTOLIC BLOOD PRESSURE: 118 MMHG | WEIGHT: 174 LBS | TEMPERATURE: 97.6 F | HEART RATE: 70 BPM | BODY MASS INDEX: 26.37 KG/M2 | RESPIRATION RATE: 16 BRPM

## 2018-04-12 DIAGNOSIS — J45.30 MILD PERSISTENT ASTHMA WITHOUT COMPLICATION: Primary | ICD-10-CM

## 2018-04-12 DIAGNOSIS — R05.9 COUGH: ICD-10-CM

## 2018-04-12 DIAGNOSIS — J45.30 MILD PERSISTENT ASTHMA WITHOUT COMPLICATION: ICD-10-CM

## 2018-04-12 PROCEDURE — 99214 OFFICE O/P EST MOD 30 MIN: CPT | Performed by: FAMILY MEDICINE

## 2018-04-12 PROCEDURE — 1111F DSCHRG MED/CURRENT MED MERGE: CPT | Performed by: FAMILY MEDICINE

## 2018-04-12 RX ORDER — PREDNISONE 10 MG/1
10 TABLET ORAL DAILY
Qty: 18 TABLET | Refills: 0 | Status: SHIPPED | OUTPATIENT
Start: 2018-04-12 | End: 2018-04-12 | Stop reason: SDUPTHER

## 2018-04-12 RX ORDER — ALBUTEROL SULFATE 90 UG/1
2 AEROSOL, METERED RESPIRATORY (INHALATION) EVERY 6 HOURS PRN
Qty: 1 INHALER | Refills: 1 | Status: SHIPPED | OUTPATIENT
Start: 2018-04-12 | End: 2018-05-12

## 2018-04-12 RX ORDER — PREDNISONE 10 MG/1
TABLET ORAL
Qty: 18 TABLET | Refills: 0 | Status: SHIPPED | OUTPATIENT
Start: 2018-04-12 | End: 2018-07-02 | Stop reason: HOSPADM

## 2018-04-12 RX ORDER — MONTELUKAST SODIUM 10 MG/1
10 TABLET ORAL
Qty: 30 TABLET | Refills: 0 | Status: SHIPPED | OUTPATIENT
Start: 2018-04-12 | End: 2018-10-31 | Stop reason: CLARIF

## 2018-04-12 NOTE — PROGRESS NOTES
Chief Complaint   Patient presents with    Follow-up     ER follow up 4/10/18, Shortness of Breath     Assessment/Plan:    Reviewed ER report  Give prednisone today  SE educated pt  Add singulair  SE educated pt  Give proair use as needed  Call office if symptoms no improving or worse  Diagnoses and all orders for this visit:    Mild persistent asthma without complication  -     QVAR 80 MCG/ACT inhaler; 2 puffs twice per day  -     albuterol (PROAIR HFA) 90 mcg/act inhaler; Inhale 2 puffs every 6 (six) hours as needed for wheezing for up to 30 days  -     predniSONE 10 mg tablet; Take 1 tablet (10 mg total) by mouth daily for 9 days Take 3 tabs for 3 days, 2 tabs for 3 days and 1 tab for 3 days  -     montelukast (SINGULAIR) 10 mg tablet; Take 1 tablet (10 mg total) by mouth daily at bedtime for 30 days    Cough  -     albuterol (PROAIR HFA) 90 mcg/act inhaler; Inhale 2 puffs every 6 (six) hours as needed for wheezing for up to 30 days          Subjective:      Patient ID: Anahy Tomlinson  is a 69 y o  male  HPI    Pt is here with wife  Was in ER 2 days ago because of SOB  CXR is normal    Labs including troponin were normal    EKG was Ok  Pt felt better after breathing treatment  Discharged home  Pt still feels SOB sometimes  Cough with phlegm  Sometimes wheezing  Denies fever, chest pain, n/v/abd pain  Hx of asthma  Seen allergist before  Was on Qvar daily  Never smoke  The following portions of the patient's history were reviewed and updated as appropriate: allergies, current medications, past family history, past medical history, past social history, past surgical history and problem list     Review of Systems   Constitutional: Negative for appetite change, chills and fever  HENT: Negative for congestion, ear pain, sinus pain and sore throat  Eyes: Negative for discharge and itching     Respiratory: Negative for apnea, cough, chest tightness, shortness of breath and wheezing  Cardiovascular: Negative for chest pain, palpitations and leg swelling  Gastrointestinal: Negative for abdominal pain, anal bleeding, constipation, diarrhea, nausea and vomiting  Endocrine: Negative for cold intolerance, heat intolerance and polyuria  Genitourinary: Negative for difficulty urinating and dysuria  Musculoskeletal: Negative for arthralgias, back pain and myalgias  Skin: Negative for rash  Neurological: Negative for dizziness and headaches  Psychiatric/Behavioral: Negative for agitation  Objective:      /76 (BP Location: Left arm, Patient Position: Sitting, Cuff Size: Adult)   Pulse 70   Temp 97 6 °F (36 4 °C) (Tympanic)   Resp 16   Ht 5' 8" (1 727 m)   Wt 78 9 kg (174 lb)   SpO2 92%   BMI 26 46 kg/m²          Physical Exam   Constitutional: He appears well-developed  HENT:   Head: Normocephalic and atraumatic  Right Ear: External ear normal    Left Ear: External ear normal    Nose: Nose normal    Mouth/Throat: Oropharynx is clear and moist    Eyes: Conjunctivae are normal  Pupils are equal, round, and reactive to light  Neck: Normal range of motion  Neck supple  Cardiovascular: Normal rate, regular rhythm, normal heart sounds and intact distal pulses  Pulmonary/Chest: Effort normal  No respiratory distress  He has wheezes  He has no rales  Abdominal: Soft  Bowel sounds are normal    Musculoskeletal: Normal range of motion  Neurological: He is alert

## 2018-04-27 NOTE — CASE MANAGEMENT
Initial Clinical Review    Admission: Date/Time/Statement: 4/10/[id number]    Orders Placed This Encounter   Procedures    Place in Observation (expected length of stay for this patient is less than two midnights)     Standing Status:   Standing     Number of Occurrences:   1     Order Specific Question:   Admitting Physician     Answer:   Dori Roberts     Order Specific Question:   Level of Care     Answer:   Med Surg [16]         ED: Date/Time/Mode of Arrival:   ED Arrival Information     Expected Arrival Acuity Means of Arrival Escorted By Service Admission Type    - 4/10/2018 08:03 Urgent Walk-In Family Member General Medicine Urgent    Arrival Complaint    sob          Chief Complaint:   Chief Complaint   Patient presents with    Shortness of Breath     Patient with cough and shortness of breath  Had similar symptoms a couple of weeks ago, had a chest xray, which was good  Patient started again 2 days ago with worsening cough and yellow mucus production         History of Illness:   ED Vital Signs:   ED Triage Vitals [04/10/18 0807]   Temperature Pulse Respirations Blood Pressure SpO2   98 2 °F (36 8 °C) 75 18 (!) 185/89 95 %      Temp Source Heart Rate Source Patient Position - Orthostatic VS BP Location FiO2 (%)   Oral Monitor Sitting Right arm --      Pain Score       No Pain        Wt Readings from Last 1 Encounters:   04/12/18 78 9 kg (174 lb)       Vital Signs (abnormal): 176/91    Abnormal Labs/Diagnostic Test Results:ekg-No significant change since prior tracing with PAC's  Right bundle branch block  Left anterior fascicular block Bifascicular block    ED Treatment:   Medication Administration from 04/10/2018 0803 to 04/10/2018 1419       Date/Time Order Dose Route Action Action by Comments     04/10/2018 1031 albuterol inhalation solution 5 mg 5 mg Nebulization Given Carlosnai Herrera RN      04/10/2018 1032 ipratropium (ATROVENT) 0 02 % inhalation solution 0 5 mg 0 5 mg Nebulization Given Araceli Christy RN           Past Medical/Surgical History:    Active Ambulatory Problems     Diagnosis Date Noted    Allergic rhinitis 06/05/2012    BPH with obstruction/lower urinary tract symptoms 05/26/2016    Depression with anxiety 01/05/2015    Excessive crying, adult 04/05/2017    Hypertension 05/04/2012    Insomnia 01/05/2015    Lewy body dementia without behavioral disturbance 03/30/2016    Memory loss 05/11/2015    Mixed hyperlipidemia 06/05/2012    Overactive bladder 09/19/2012    Parkinson's disease ([facility] [address] ) 10/06/2015    Mild persistent asthma without complication 28/95/7283     Resolved Ambulatory Problems     Diagnosis Date Noted    Extrinsic asthma 06/05/2012     Past Medical History:   Diagnosis Date    Asthma     Depression with anxiety     Parkinsons ([facility] )        Admitting Diagnosis: Cough [R05]  Dyspnea [R06 00]  SOB (shortness of breath) [R06 02]  EKG abnormalities [R94 31]    Age/Sex: 69 y o  male    Assessment/Plan:  SOB (shortness of breath)   Assessment & Plan     ?related to ACS given subtle EKG changes; low suspicion given lack of exertional symptoms, negative troponin   Cont to trend troponins  Monitor with telemetry  EKG with 2nd troponin  Prn nebs for SOB              Lewy body dementia without behavioral disturbance   Assessment & Plan     Cont nuedexta (dextromethorphan/quinidine)              Parkinson's disease (HCC)   Assessment & Plan     Cont sinemet           BPH with obstruction/lower urinary tract symptoms   Assessment & Plan     cont tamsulosin   cont finasteride           Allergic rhinitis   Assessment & Plan     May be cause of primary problem  Supportive care              VTE Prophylaxis: Enoxaparin (Lovenox)  / sequential compression device   Code Status: FULL   POLST: POLST form is not discussed and not completed at this time      Anticipated Length of Stay:  Patient will be admitted on an Observation basis with an anticipated length of stay of  < 2 midnights  Justification for Hospital Stay: chest pain          Admission Orders:  Scheduled Meds:   Continuous Infusions:   No current facility-administered medications for this encounter     PRN Meds:   D/c'd within

## 2018-05-14 DIAGNOSIS — N13.8 BPH WITH OBSTRUCTION/LOWER URINARY TRACT SYMPTOMS: Primary | ICD-10-CM

## 2018-05-14 DIAGNOSIS — N40.1 BPH WITH OBSTRUCTION/LOWER URINARY TRACT SYMPTOMS: Primary | ICD-10-CM

## 2018-05-14 RX ORDER — TAMSULOSIN HYDROCHLORIDE 0.4 MG/1
0.4 CAPSULE ORAL DAILY
Qty: 90 CAPSULE | Refills: 3 | Status: SHIPPED | OUTPATIENT
Start: 2018-05-14

## 2018-05-16 DIAGNOSIS — R31.0 GROSS HEMATURIA: ICD-10-CM

## 2018-05-17 ENCOUNTER — APPOINTMENT (OUTPATIENT)
Dept: LAB | Facility: HOSPITAL | Age: 78
End: 2018-05-17
Payer: COMMERCIAL

## 2018-05-17 ENCOUNTER — TRANSCRIBE ORDERS (OUTPATIENT)
Dept: LAB | Facility: HOSPITAL | Age: 78
End: 2018-05-17
Payer: COMMERCIAL

## 2018-05-17 DIAGNOSIS — R31.0 GROSS HEMATURIA: Primary | ICD-10-CM

## 2018-05-17 DIAGNOSIS — R31.0 GROSS HEMATURIA: ICD-10-CM

## 2018-05-17 PROCEDURE — 88112 CYTOPATH CELL ENHANCE TECH: CPT | Performed by: PATHOLOGY

## 2018-05-30 ENCOUNTER — OFFICE VISIT (OUTPATIENT)
Dept: UROLOGY | Facility: AMBULATORY SURGERY CENTER | Age: 78
End: 2018-05-30
Payer: COMMERCIAL

## 2018-05-30 VITALS
DIASTOLIC BLOOD PRESSURE: 82 MMHG | BODY MASS INDEX: 28.25 KG/M2 | WEIGHT: 175.8 LBS | HEIGHT: 66 IN | HEART RATE: 80 BPM | SYSTOLIC BLOOD PRESSURE: 152 MMHG

## 2018-05-30 DIAGNOSIS — N40.0 BENIGN PROSTATIC HYPERPLASIA, UNSPECIFIED WHETHER LOWER URINARY TRACT SYMPTOMS PRESENT: Primary | ICD-10-CM

## 2018-05-30 LAB — POST-VOID RESIDUAL VOLUME, ML POC: 127 ML

## 2018-05-30 PROCEDURE — 99214 OFFICE O/P EST MOD 30 MIN: CPT | Performed by: UROLOGY

## 2018-05-30 PROCEDURE — 51798 US URINE CAPACITY MEASURE: CPT | Performed by: UROLOGY

## 2018-05-30 RX ORDER — ALBUTEROL SULFATE 90 UG/1
1 AEROSOL, METERED RESPIRATORY (INHALATION) EVERY 6 HOURS
COMMUNITY
End: 2019-02-05

## 2018-05-30 NOTE — PROGRESS NOTES
5/30/2018    Jorge Pierre  [birth year]  [id number]        Assessment  Lower urinary tract symptoms, BPH, Parkinson's disease    Plan  We discussed that this is a difficult situation, in light of his outlet obstruction in conjunction with Parkinson's disease  Patient's wife understands this  He will continue with tamsulosin and finasteride  We discussed trial of Myrbetriq once again  He has tried this before about 2 years ago  She is willing to try  He will follow up in about 4 to 6 weeks to discuss  If no results, may wish to continue with conservative management  Total visit time was 25 minutes of which over 50% was spent on counseling  History of Present Illness  Eileen Ibrahim is a 69 y o  male with history of BPH, neurogenic bladder symptoms due to Parkinson's disease  He has been diagnosed with Lewy body dementia  AUA symptom score is 7, however he is going through about 2 to 3 depends per day with incontinence  He is not bothered by this at all  He underwent cystoscopy in 2016 which revealed bladder outlet obstruction due to BPH  We decided against TURP as he was emptying well and had Parkinson's disease  Bladder scan postvoid residual in the office today 127 mL  AUA SYMPTOM SCORE      Most Recent Value   AUA SYMPTOM SCORE   How often have you had a sensation of not emptying your bladder completely after you finished urinating? 0   How often have you had to urinate again less than two hours after you finished urinating? 4   How often have you found you stopped and started again several times when you urinate? 1   How often have you found it difficult to postpone urination? 0   How often have you had a weak urinary stream?  2   How often have you had to push or strain to begin urination?   0   How many times did you most typically get up to urinate from the time you went to bed at night until the time you got up in the morning?  0   Quality of Life: If you were to spend the rest of your life with your urinary condition just the way it is now, how would you feel about that?  4   AUA SYMPTOM SCORE  7          Review of Systems  Review of Systems   Constitutional: Negative  HENT: Negative  Respiratory: Negative  Cardiovascular: Negative  Gastrointestinal: Negative  Genitourinary:        As per HPI   Musculoskeletal: Negative  Skin: Negative  Neurological:        Parkinson's disease/Lewy body dementia   Hematological: Negative  Past Medical History  Past Medical History:   Diagnosis Date    Asthma     Depression with anxiety     last assessed 12/5/14    Parkinsons [facility])        Past Social History  Past Surgical History:   Procedure Laterality Date    CATARACT EXTRACTION  2003    COLONOSCOPY  2002    complete    HERNIA REPAIR  1998    PROSTATE BIOPSY      needle bx       Past Family History  Family History   Problem Relation Age of Onset    Coronary artery disease Father     Stroke Father        Past Social history  Social History     Social History    Marital status: /Civil Union     Spouse name: N/A    Number of children: N/A    Years of education: N/A     Occupational History    Not on file       Social History Main Topics    Smoking status: Never Smoker    Smokeless tobacco: Never Used    Alcohol use Yes      Comment: social    Drug use: No    Sexual activity: Not on file     Other Topics Concern    Not on file     Social History Narrative    No narrative on file     History   Smoking Status    Never Smoker   Smokeless Tobacco    Never Used       Current Medications  Current Outpatient Prescriptions   Medication Sig Dispense Refill    ammonium lactate (LAC-HYDRIN) 12 % cream   1    buPROPion (WELLBUTRIN XL) 150 mg 24 hr tablet Take 1 tablet by mouth daily      busPIRone (BUSPAR) 10 mg tablet Take 1 tablet (10 mg total) by mouth daily for 90 days 90 tablet 1    carbidopa-levodopa (SINEMET)  mg per tablet TK 1 T PO QID  0    Coenzyme Q10 (UBIDECARENONE) POWD Take 1 tablet by mouth      cycloSPORINE (RESTASIS) 0 05 % ophthalmic emulsion Apply 1 drop to eye every 12 (twelve) hours      dextromethorphan-quinidine (NUEDEXTA) 20-10 mg Take 1 capsule by mouth daily      donepezil (ARICEPT) 10 mg tablet daily at bedtime    3    DYMISTA 137-50 MCG/ACT SUSP INSTIL 1 SPRAY IN EACH NOSTRIL TWICE A DAY AS NEEDED  5    finasteride (PROSCAR) 5 mg tablet Take 1 tablet by mouth daily      Multiple Vitamins-Minerals (CENTRUM SILVER 50+MEN PO) Take 1 tablet by mouth daily      QVAR 80 MCG/ACT inhaler 2 puffs twice per day 1 Inhaler 5    saccharomyces boulardii (FLORASTOR) 250 mg capsule Take 1 tablet by mouth      tamsulosin (FLOMAX) 0 4 mg Take 1 capsule (0 4 mg total) by mouth daily 90 capsule 3    triamcinolone (KENALOG) 0 1 % lotion Apply topically 3 (three) times a day      albuterol (PROVENTIL HFA,VENTOLIN HFA) 90 mcg/act inhaler Inhale 1 puff every 6 (six) hours      montelukast (SINGULAIR) 10 mg tablet Take 1 tablet (10 mg total) by mouth daily at bedtime for 30 days 30 tablet 0    predniSONE 10 mg tablet Take 3 tabs for 3 days, 2 tabs for 3 days and 1 tab for 3 days 18 tablet 0     No current facility-administered medications for this visit  Allergies  No Known Allergies    Past Medical History, Social History, Family History, medications and allergies were reviewed  Vitals  Vitals:    05/30/18 1348   BP: 152/82   Pulse: 80   Weight: 79 7 kg (175 lb 12 8 oz)   Height: 5' 6" (1 676 m)       Physical Exam  Physical Exam   Constitutional: He is oriented to person, place, and time  He appears well-developed and well-nourished  Cardiovascular: Normal rate  Pulmonary/Chest: Effort normal    Abdominal: Soft  Genitourinary:   Genitourinary Comments: Prostate about 80+ g, smooth, firm  Musculoskeletal:   Slow gait   Neurological: He is alert and oriented to person, place, and time  Skin: Skin is warm, dry and intact  Psychiatric: He has a normal mood and affect  Vitals reviewed          Results  No results found for: PSA  Lab Results   Component Value Date    GLUCOSE 94 04/10/2018    CALCIUM 9 1 04/10/2018     04/10/2018    K 4 0 04/10/2018    CO2 28 04/10/2018     04/10/2018    BUN 12 04/10/2018    CREATININE 1 03 04/10/2018     Lab Results   Component Value Date    WBC 7 54 04/10/2018    HGB 16 4 04/10/2018    HCT 47 8 04/10/2018     (H) 04/10/2018     04/10/2018

## 2018-05-30 NOTE — LETTER
May 30, 2018     Mert Israel, [address]    Patient: Danii Henley  YOB: 1940   Date of Visit: 5/30/2018       Dear Dr Radha Mathew: Thank you for referring Rachel Pearce to me for evaluation  Below are my notes for this consultation  If you have questions, please do not hesitate to call me  I look forward to following your patient along with you  Sincerely,        Viola Kathleen MD        CC: No Recipients  Viola Kathleen MD  5/30/2018  4:03 PM  Sign at close encounter  5/30/2018    Danii Henley   1940  [id number]        Assessment  Lower urinary tract symptoms, BPH, Parkinson's disease    Plan  We discussed that this is a difficult situation, in light of his outlet obstruction in conjunction with Parkinson's disease  Patient's wife understands this  He will continue with tamsulosin and finasteride  We discussed trial of Myrbetriq once again  He has tried this before about 2 years ago  She is willing to try  He will follow up in about 4 to 6 weeks to discuss  If no results, may wish to continue with conservative management  Total visit time was 25 minutes of which over 50% was spent on counseling  History of Present Illness  Natasha Thompson is a 69 y o  male with history of BPH, neurogenic bladder symptoms due to Parkinson's disease  He has been diagnosed with Lewy body dementia  AUA symptom score is 7, however he is going through about 2 to 3 depends per day with incontinence  He is not bothered by this at all  He underwent cystoscopy in 2016 which revealed bladder outlet obstruction due to BPH  We decided against TURP as he was emptying well and had Parkinson's disease  Bladder scan postvoid residual in the office today 127 mL  AUA SYMPTOM SCORE      Most Recent Value   AUA SYMPTOM SCORE   How often have you had a sensation of not emptying your bladder completely after you finished urinating?   0   How often have you had to urinate again less than two hours after you finished urinating? 4   How often have you found you stopped and started again several times when you urinate? 1   How often have you found it difficult to postpone urination? 0   How often have you had a weak urinary stream?  2   How often have you had to push or strain to begin urination? 0   How many times did you most typically get up to urinate from the time you went to bed at night until the time you got up in the morning?  0   Quality of Life: If you were to spend the rest of your life with your urinary condition just the way it is now, how would you feel about that?  4   AUA SYMPTOM SCORE  7          Review of Systems  Review of Systems   Constitutional: Negative  HENT: Negative  Respiratory: Negative  Cardiovascular: Negative  Gastrointestinal: Negative  Genitourinary:        As per HPI   Musculoskeletal: Negative  Skin: Negative  Neurological:        Parkinson's disease/Lewy body dementia   Hematological: Negative  Past Medical History  Past Medical History:   Diagnosis Date    Asthma     Depression with anxiety     last assessed 12/5/14    [facility])        Past Social History  Past Surgical History:   Procedure Laterality Date    CATARACT EXTRACTION  2003    COLONOSCOPY  2002    complete    HERNIA REPAIR  1998    PROSTATE BIOPSY      needle bx       Past Family History  Family History   Problem Relation Age of Onset    Coronary artery disease Father     Stroke Father        Past Social history  Social History     Social History    Marital status: /Civil Union     Spouse name: N/A    Number of children: N/A    Years of education: N/A     Occupational History    Not on file       Social History Main Topics    Smoking status: Never Smoker    Smokeless tobacco: Never Used    Alcohol use Yes      Comment: social    Drug use: No    Sexual activity: Not on file     Other Topics Concern    Not on file     Social History Narrative    No narrative on file     History   Smoking Status    Never Smoker   Smokeless Tobacco    Never Used       Current Medications  Current Outpatient Prescriptions   Medication Sig Dispense Refill    ammonium lactate (LAC-HYDRIN) 12 % cream   1    buPROPion (WELLBUTRIN XL) 150 mg 24 hr tablet Take 1 tablet by mouth daily      busPIRone (BUSPAR) 10 mg tablet Take 1 tablet (10 mg total) by mouth daily for 90 days 90 tablet 1    carbidopa-levodopa (SINEMET)  mg per tablet TK 1 T PO QID  0    Coenzyme Q10 (UBIDECARENONE) POWD Take 1 tablet by mouth      cycloSPORINE (RESTASIS) 0 05 % ophthalmic emulsion Apply 1 drop to eye every 12 (twelve) hours      dextromethorphan-quinidine (NUEDEXTA) 20-10 mg Take 1 capsule by mouth daily      donepezil (ARICEPT) 10 mg tablet daily at bedtime    3    DYMISTA 137-50 MCG/ACT SUSP INSTIL 1 SPRAY IN EACH NOSTRIL TWICE A DAY AS NEEDED  5    finasteride (PROSCAR) 5 mg tablet Take 1 tablet by mouth daily      Multiple Vitamins-Minerals (CENTRUM SILVER 50+MEN PO) Take 1 tablet by mouth daily      QVAR 80 MCG/ACT inhaler 2 puffs twice per day 1 Inhaler 5    saccharomyces boulardii (FLORASTOR) 250 mg capsule Take 1 tablet by mouth      tamsulosin (FLOMAX) 0 4 mg Take 1 capsule (0 4 mg total) by mouth daily 90 capsule 3    triamcinolone (KENALOG) 0 1 % lotion Apply topically 3 (three) times a day      albuterol (PROVENTIL HFA,VENTOLIN HFA) 90 mcg/act inhaler Inhale 1 puff every 6 (six) hours      montelukast (SINGULAIR) 10 mg tablet Take 1 tablet (10 mg total) by mouth daily at bedtime for 30 days 30 tablet 0    predniSONE 10 mg tablet Take 3 tabs for 3 days, 2 tabs for 3 days and 1 tab for 3 days 18 tablet 0     No current facility-administered medications for this visit  Allergies  No Known Allergies    Past Medical History, Social History, Family History, medications and allergies were reviewed      Vitals  Vitals:    05/30/18 1348   BP: 152/82   Pulse: 80   Weight: 79 7 kg (175 lb 12 8 oz)   Height: 5' 6" (1 676 m)       Physical Exam  Physical Exam   Constitutional: He is oriented to person, place, and time  He appears well-developed and well-nourished  Cardiovascular: Normal rate  Pulmonary/Chest: Effort normal    Abdominal: Soft  Genitourinary:   Genitourinary Comments: Prostate about 80+ g, smooth, firm  Musculoskeletal:   Slow gait   Neurological: He is alert and oriented to person, place, and time  Skin: Skin is warm, dry and intact  Psychiatric: He has a normal mood and affect  Vitals reviewed          Results  No results found for: PSA  Lab Results   Component Value Date    GLUCOSE 94 04/10/2018    CALCIUM 9 1 04/10/2018     04/10/2018    K 4 0 04/10/2018    CO2 28 04/10/2018     04/10/2018    BUN 12 04/10/2018    CREATININE 1 03 04/10/2018     Lab Results   Component Value Date    WBC 7 54 04/10/2018    HGB 16 4 04/10/2018    HCT 47 8 04/10/2018     (H) 04/10/2018     04/10/2018

## 2018-07-02 ENCOUNTER — OFFICE VISIT (OUTPATIENT)
Dept: FAMILY MEDICINE CLINIC | Facility: CLINIC | Age: 78
End: 2018-07-02
Payer: COMMERCIAL

## 2018-07-02 VITALS
DIASTOLIC BLOOD PRESSURE: 80 MMHG | OXYGEN SATURATION: 94 % | RESPIRATION RATE: 16 BRPM | TEMPERATURE: 99.3 F | BODY MASS INDEX: 28.45 KG/M2 | HEIGHT: 66 IN | HEART RATE: 74 BPM | SYSTOLIC BLOOD PRESSURE: 124 MMHG | WEIGHT: 177 LBS

## 2018-07-02 DIAGNOSIS — M54.50 CHRONIC BILATERAL LOW BACK PAIN WITHOUT SCIATICA: Primary | ICD-10-CM

## 2018-07-02 DIAGNOSIS — G89.29 CHRONIC BILATERAL LOW BACK PAIN WITHOUT SCIATICA: Primary | ICD-10-CM

## 2018-07-02 PROCEDURE — 99214 OFFICE O/P EST MOD 30 MIN: CPT | Performed by: FAMILY MEDICINE

## 2018-07-02 RX ORDER — IBUPROFEN 600 MG/1
600 TABLET ORAL EVERY 8 HOURS PRN
Qty: 30 TABLET | Refills: 0 | Status: SHIPPED | OUTPATIENT
Start: 2018-07-02 | End: 2018-08-03 | Stop reason: SDUPTHER

## 2018-07-02 NOTE — PROGRESS NOTES
Chief Complaint   Patient presents with    Back Pain     Assessment/Plan:    Will check lower back xray  Give ibuprofen  SE educated pt and wife  Take it with food  Refer to physical therapy  Pt refused to go  Refer to pain management  Call office if symptoms no improving or worse  Diagnoses and all orders for this visit:    Chronic bilateral low back pain without sciatica  -     XR spine lumbar minimum 4 views non injury; Future  -     ibuprofen (MOTRIN) 600 mg tablet; Take 1 tablet (600 mg total) by mouth every 8 (eight) hours as needed for mild pain for up to 10 days  -     Ambulatory referral to Physical Therapy; Future  -     Ambulatory referral to Pain Management; Future    Other orders  -     Mirabegron ER (MYRBETRIQ) 50 MG TB24; Take 1 tablet by mouth daily          Subjective:      Patient ID: Yvette Maharaj  is a 69 y o  male  HPI     Pt is here with his wife  C/o lower back pain for several months  Worse now  No injury  When he walks or sits, he is trying to lean toward left side  Pain come and go  Sometimes radiating to his b/l hips, not radiating to legs  Worse if he tried to change his position  Better if he just sits  Tried chiorapractor but did not help  Constipation and strain sometimes which made his back pain worse  BPH---[facility]  Denies fever, SOB, cP, n/v/abd pain  The following portions of the patient's history were reviewed and updated as appropriate: allergies, current medications, past family history, past medical history, past social history, past surgical history and problem list     Review of Systems   Constitutional: Negative for appetite change, chills and fever  HENT: Negative for congestion, ear pain, sinus pain and sore throat  Eyes: Negative for discharge and itching  Respiratory: Negative for apnea, cough, chest tightness, shortness of breath and wheezing      Cardiovascular: Negative for chest pain, palpitations and leg swelling  Gastrointestinal: Negative for abdominal pain, anal bleeding, constipation, diarrhea, nausea and vomiting  Endocrine: Negative for cold intolerance, heat intolerance and polyuria  Genitourinary: Negative for difficulty urinating and dysuria  Musculoskeletal: Negative for arthralgias, back pain and myalgias  Skin: Negative for rash  Neurological: Negative for dizziness and headaches  Psychiatric/Behavioral: Negative for agitation  Objective:      /80 (BP Location: Left arm, Patient Position: Sitting, Cuff Size: Adult)   Pulse 74   Temp 99 3 °F (37 4 °C) (Tympanic)   Resp 16   Ht 5' 6" (1 676 m)   Wt 80 3 kg (177 lb)   SpO2 94%   BMI 28 57 kg/m²          Physical Exam   Constitutional: He appears well-developed  HENT:   Head: Normocephalic and atraumatic  Eyes: Conjunctivae are normal  Pupils are equal, round, and reactive to light  Neck: Normal range of motion  Neck supple  Cardiovascular: Normal rate, regular rhythm, normal heart sounds and intact distal pulses  Pulmonary/Chest: Effort normal and breath sounds normal    Abdominal: Soft  Bowel sounds are normal    Musculoskeletal:   Lower back tenderness, no swollen or erythema  Gait affected   Neurological: He is alert

## 2018-07-03 NOTE — PROGRESS NOTES
7/5/2018    Mode Flowers  [birth year]  [id number]      Assessment  -BPH with lower urinary tract symptoms  -Neurogenic bladder secondary to Parkinson's disease    Discussion/Plan  Kathy Melvin is a 69 y o  male being managed by Dr Brittany Pool        -PVR is 0cc  -Refill for Myrbetriq 50 mg was sent to patient's pharmacy  He will continue to take tamsulosin and finasteride daily  -Follow up in 1 year with [facility]  -All questions answered, patient agrees with plan      History of Present Illness  69 y o  male with a history of BPH with LUTS and neurogenic bladder presents today for follow up  Patient continues to take tamsulosin and finasteride daily  He was recently provided trial of Myrbetriq at last office visit for persistent urinary incontinence  Last cystoscopy performed in 2016 showed bladder outlet obstruction secondary to BPH  No further intervention with TURP was advised as patient has remained asymptomatic and as Parkinson's disease  Wife reports improvement in urinary symptoms with the start of Myrbetriq 50 mg  They deny any episodes of gross hematuria or dysuria  Patient denies incomplete bladder emptying  No overall changes in health since last office visit  Review of Systems  Review of Systems   Constitutional: Negative  HENT: Negative  Respiratory: Negative  Cardiovascular: Negative  Gastrointestinal: Negative  Musculoskeletal: Negative  Skin: Negative  Neurological: Negative  Psychiatric/Behavioral: Negative  AUA SYMPTOM SCORE      Most Recent Value   AUA SYMPTOM SCORE   How often have you had a sensation of not emptying your bladder completely after you finished urinating? 0   How often have you had to urinate again less than two hours after you finished urinating? 0   How often have you found you stopped and started again several times when you urinate? 1   How often have you found it difficult to postpone urination?   1   How often have you had a weak urinary stream?  1   How often have you had to push or strain to begin urination? 0   How many times did you most typically get up to urinate from the time you went to bed at night until the time you got up in the morning?  0   Quality of Life: If you were to spend the rest of your life with your urinary condition just the way it is now, how would you feel about that?  --   AUA SYMPTOM SCORE  3          Past Medical History  Past Medical History:   Diagnosis Date    Asthma     Depression with anxiety     last assessed 12/5/14    [facility])        Past Social History  Past Surgical History:   Procedure Laterality Date    CATARACT EXTRACTION  2003    COLONOSCOPY  2002    complete    HERNIA REPAIR  1998    PROSTATE BIOPSY      needle bx       Past Family History  Family History   Problem Relation Age of Onset    Coronary artery disease Father     Stroke Father        Past Social history  Social History     Social History    Marital status: /Civil Union     Spouse name: N/A    Number of children: N/A    Years of education: N/A     Occupational History    Not on file       Social History Main Topics    Smoking status: Never Smoker    Smokeless tobacco: Never Used    Alcohol use Yes      Comment: social    Drug use: No    Sexual activity: Not on file     Other Topics Concern    Not on file     Social History Narrative    No narrative on file       Current Medications  Current Outpatient Prescriptions   Medication Sig Dispense Refill    albuterol (PROVENTIL HFA,VENTOLIN HFA) 90 mcg/act inhaler Inhale 1 puff every 6 (six) hours      ammonium lactate (LAC-HYDRIN) 12 % cream   1    buPROPion (WELLBUTRIN XL) 150 mg 24 hr tablet Take 1 tablet by mouth daily      busPIRone (BUSPAR) 10 mg tablet Take 1 tablet (10 mg total) by mouth daily for 90 days 90 tablet 1    carbidopa-levodopa (SINEMET)  mg per tablet TK 1 T PO QID  0    Coenzyme Q10 (UBIDECARENONE) POWD Take 1 tablet by mouth      cycloSPORINE (RESTASIS) 0 05 % ophthalmic emulsion Apply 1 drop to eye every 12 (twelve) hours      dextromethorphan-quinidine (NUEDEXTA) 20-10 mg Take 1 capsule by mouth daily      donepezil (ARICEPT) 10 mg tablet daily at bedtime    3    DYMISTA 137-50 MCG/ACT SUSP INSTIL 1 SPRAY IN EACH NOSTRIL TWICE A DAY AS NEEDED  5    finasteride (PROSCAR) 5 mg tablet Take 1 tablet by mouth daily      ibuprofen (MOTRIN) 600 mg tablet Take 1 tablet (600 mg total) by mouth every 8 (eight) hours as needed for mild pain for up to 10 days 30 tablet 0    Mirabegron ER (MYRBETRIQ) 50 MG TB24 Take 1 tablet by mouth daily      montelukast (SINGULAIR) 10 mg tablet Take 1 tablet (10 mg total) by mouth daily at bedtime for 30 days 30 tablet 0    Multiple Vitamins-Minerals (CENTRUM SILVER 50+MEN PO) Take 1 tablet by mouth daily      QVAR 80 MCG/ACT inhaler 2 puffs twice per day 1 Inhaler 5    saccharomyces boulardii (FLORASTOR) 250 mg capsule Take 1 tablet by mouth      tamsulosin (FLOMAX) 0 4 mg Take 1 capsule (0 4 mg total) by mouth daily 90 capsule 3    triamcinolone (KENALOG) 0 1 % lotion Apply topically 3 (three) times a day       No current facility-administered medications for this visit  Allergies  No Known Allergies    Past Medical History, Social History, Family History, medications and allergies were reviewed  Vitals  There were no vitals filed for this visit  Physical Exam  Physical Exam   Constitutional: He is oriented to person, place, and time  He appears well-developed and well-nourished  HENT:   Head: Normocephalic  Eyes: Pupils are equal, round, and reactive to light  Neck: Normal range of motion  Cardiovascular: Normal rate and regular rhythm  Pulmonary/Chest: Effort normal    Abdominal: Soft  Normal appearance  There is no CVA tenderness  Musculoskeletal: Normal range of motion  Neurological: He is alert and oriented to person, place, and time  He has normal reflexes     Skin: Skin is warm and dry  Psychiatric: He has a normal mood and affect  His behavior is normal  Judgment and thought content normal        Results    I have personally reviewed all pertinent lab results and reviewed with patient  No results found for: PSA  Lab Results   Component Value Date    GLUCOSE 94 04/10/2018    CALCIUM 9 1 04/10/2018     04/10/2018    K 4 0 04/10/2018    CO2 28 04/10/2018     04/10/2018    BUN 12 04/10/2018    CREATININE 1 03 04/10/2018     Lab Results   Component Value Date    WBC 7 54 04/10/2018    HGB 16 4 04/10/2018    HCT 47 8 04/10/2018     (H) 04/10/2018     04/10/2018     No results found for this or any previous visit (from the past 1 hour(s))

## 2018-07-05 ENCOUNTER — OFFICE VISIT (OUTPATIENT)
Dept: UROLOGY | Facility: AMBULATORY SURGERY CENTER | Age: 78
End: 2018-07-05
Payer: COMMERCIAL

## 2018-07-05 VITALS
HEART RATE: 70 BPM | HEIGHT: 66 IN | WEIGHT: 177 LBS | BODY MASS INDEX: 28.45 KG/M2 | DIASTOLIC BLOOD PRESSURE: 68 MMHG | SYSTOLIC BLOOD PRESSURE: 110 MMHG

## 2018-07-05 DIAGNOSIS — N31.9 NEUROGENIC BLADDER: ICD-10-CM

## 2018-07-05 DIAGNOSIS — N40.1 BENIGN PROSTATIC HYPERPLASIA WITH URINARY RETENTION: Primary | ICD-10-CM

## 2018-07-05 DIAGNOSIS — R33.8 BENIGN PROSTATIC HYPERPLASIA WITH URINARY RETENTION: Primary | ICD-10-CM

## 2018-07-05 LAB — POST-VOID RESIDUAL VOLUME, ML POC: 0 ML

## 2018-07-05 PROCEDURE — 99213 OFFICE O/P EST LOW 20 MIN: CPT | Performed by: NURSE PRACTITIONER

## 2018-07-05 PROCEDURE — 51798 US URINE CAPACITY MEASURE: CPT | Performed by: NURSE PRACTITIONER

## 2018-07-12 ENCOUNTER — HOSPITAL ENCOUNTER (OUTPATIENT)
Dept: RADIOLOGY | Facility: HOSPITAL | Age: 78
Discharge: HOME | End: 2018-07-12
Payer: COMMERCIAL

## 2018-07-12 ENCOUNTER — TRANSCRIBE ORDERS (OUTPATIENT)
Dept: RADIOLOGY | Facility: HOSPITAL | Age: 78
End: 2018-07-12

## 2018-07-12 DIAGNOSIS — G89.29 CHRONIC BILATERAL LOW BACK PAIN WITHOUT SCIATICA: ICD-10-CM

## 2018-07-12 DIAGNOSIS — M54.50 CHRONIC BILATERAL LOW BACK PAIN WITHOUT SCIATICA: ICD-10-CM

## 2018-07-12 PROCEDURE — 72110 X-RAY EXAM L-2 SPINE 4/>VWS: CPT

## 2018-07-31 DIAGNOSIS — N40.1 BENIGN PROSTATIC HYPERPLASIA WITH LOWER URINARY TRACT SYMPTOMS, SYMPTOM DETAILS UNSPECIFIED: Primary | ICD-10-CM

## 2018-07-31 RX ORDER — FINASTERIDE 5 MG/1
5 TABLET, FILM COATED ORAL DAILY
Qty: 90 TABLET | Refills: 3 | Status: SHIPPED | OUTPATIENT
Start: 2018-07-31

## 2018-08-03 ENCOUNTER — OFFICE VISIT (OUTPATIENT)
Dept: FAMILY MEDICINE CLINIC | Facility: CLINIC | Age: 78
End: 2018-08-03
Payer: COMMERCIAL

## 2018-08-03 VITALS
HEIGHT: 66 IN | RESPIRATION RATE: 16 BRPM | BODY MASS INDEX: 27.32 KG/M2 | WEIGHT: 170 LBS | DIASTOLIC BLOOD PRESSURE: 82 MMHG | OXYGEN SATURATION: 94 % | TEMPERATURE: 99.1 F | SYSTOLIC BLOOD PRESSURE: 120 MMHG | HEART RATE: 74 BPM

## 2018-08-03 DIAGNOSIS — G31.83 LEWY BODY DEMENTIA WITHOUT BEHAVIORAL DISTURBANCE (HCC): ICD-10-CM

## 2018-08-03 DIAGNOSIS — F02.80 LEWY BODY DEMENTIA WITHOUT BEHAVIORAL DISTURBANCE (HCC): ICD-10-CM

## 2018-08-03 DIAGNOSIS — M54.50 CHRONIC BILATERAL LOW BACK PAIN WITHOUT SCIATICA: Primary | ICD-10-CM

## 2018-08-03 DIAGNOSIS — R26.81 UNSTEADY GAIT: ICD-10-CM

## 2018-08-03 DIAGNOSIS — G20 PARKINSON'S DISEASE (HCC): ICD-10-CM

## 2018-08-03 DIAGNOSIS — G89.29 CHRONIC BILATERAL LOW BACK PAIN WITHOUT SCIATICA: Primary | ICD-10-CM

## 2018-08-03 PROCEDURE — 3008F BODY MASS INDEX DOCD: CPT | Performed by: FAMILY MEDICINE

## 2018-08-03 PROCEDURE — 99214 OFFICE O/P EST MOD 30 MIN: CPT | Performed by: FAMILY MEDICINE

## 2018-08-03 RX ORDER — IBUPROFEN 600 MG/1
600 TABLET ORAL EVERY 8 HOURS PRN
Qty: 30 TABLET | Refills: 3 | Status: SHIPPED | OUTPATIENT
Start: 2018-08-03 | End: 2019-02-22

## 2018-08-03 NOTE — PROGRESS NOTES
Chief Complaint   Patient presents with    Back Pain     Scoliosis     Assessment/Plan:    Pt refused PT or pain specialist  Consider home physical therapy but pt refused now  Continue ibuprofen  SE educated pt  Take it with food and drink enough fluids  Give script of lily lift  Pt needs lily lift because it can help him get out of bed and use chair/ commode  Otherwise, he will become bed bound  RTO as scheduled with labs  Diagnoses and all orders for this visit:    Chronic bilateral low back pain without sciatica  -     ibuprofen (MOTRIN) 600 mg tablet; Take 1 tablet (600 mg total) by mouth every 8 (eight) hours as needed for mild pain for up to 10 days  -     CBC and differential; Future  -     Comprehensive metabolic panel; Future  -     TSH, 3rd generation with Free T4 reflex; Future    Unsteady gait    Lewy body dementia without behavioral disturbance          Subjective:      Patient ID: Latonia Montana  is a 69 y o  male  HPI    Pt is here with wife  C/o back pain for a while  Come and go  Sometimes pain radiating to left side  Denies numbness/tingling of legs  Lumbar xray showed scoliosis with severe arthritis  Pt tried ibuprofen which helped some  Refused to see physical therapy or pain specialist    Denies recent falls  Pt has Lewy body dementia and Parkinson's disease  Pt has unsteady gait  Pt refused to use cane or walker  He holds his wife while walking and he is afraid of falling  Wife would like to have a lily lift to help moving him in the house  Pt refused people going to their house to help  The following portions of the patient's history were reviewed and updated as appropriate: allergies, current medications, past family history, past medical history, past social history, past surgical history and problem list     Review of Systems   Constitutional: Negative for appetite change, chills and fever     HENT: Negative for congestion, ear pain, sinus pain and sore throat  Eyes: Negative for discharge and itching  Respiratory: Negative for apnea, cough, chest tightness, shortness of breath and wheezing  Cardiovascular: Negative for chest pain, palpitations and leg swelling  Gastrointestinal: Negative for abdominal pain, anal bleeding, constipation, diarrhea, nausea and vomiting  Endocrine: Negative for cold intolerance, heat intolerance and polyuria  Genitourinary: Negative for difficulty urinating and dysuria  Musculoskeletal: Positive for back pain and gait problem  Negative for arthralgias and myalgias  Skin: Negative for rash  Neurological: Negative for dizziness and headaches  Psychiatric/Behavioral: Negative for agitation  Objective:      /82 (BP Location: Left arm, Patient Position: Sitting, Cuff Size: Adult)   Pulse 74   Temp 99 1 °F (37 3 °C) (Tympanic)   Resp 16   Ht 5' 6" (1 676 m)   Wt 77 1 kg (170 lb)   SpO2 94%   BMI 27 44 kg/m²          Physical Exam   Constitutional: He appears well-developed  HENT:   Head: Normocephalic and atraumatic  Eyes: Conjunctivae are normal  Pupils are equal, round, and reactive to light  Neck: Normal range of motion  Neck supple  Cardiovascular: Normal rate, regular rhythm, normal heart sounds and intact distal pulses  Pulmonary/Chest: Effort normal and breath sounds normal    Abdominal: Soft  Bowel sounds are normal    Musculoskeletal:   Unsteady gait   Neurological: He is alert

## 2018-08-08 ENCOUNTER — TELEPHONE (OUTPATIENT)
Dept: FAMILY MEDICINE CLINIC | Facility: CLINIC | Age: 78
End: 2018-08-08

## 2018-08-08 DIAGNOSIS — J45.30 MILD PERSISTENT ASTHMA WITHOUT COMPLICATION: Primary | ICD-10-CM

## 2018-08-28 ENCOUNTER — PATIENT OUTREACH (OUTPATIENT)
Dept: FAMILY MEDICINE CLINIC | Facility: CLINIC | Age: 78
End: 2018-08-28

## 2018-09-20 ENCOUNTER — TELEPHONE (OUTPATIENT)
Dept: FAMILY MEDICINE CLINIC | Facility: CLINIC | Age: 78
End: 2018-09-20

## 2018-09-20 DIAGNOSIS — G20 PARKINSON'S DISEASE (HCC): Primary | ICD-10-CM

## 2018-09-20 NOTE — TELEPHONE ENCOUNTER
Anthony Hanks, Physical Therapist, [facility] (P: [phone]) phoned requesting a prescription to [facility] for a wheelchair - dx: Parkinsons

## 2018-09-21 ENCOUNTER — TRANSCRIBE ORDERS (OUTPATIENT)
Dept: LAB | Facility: HOSPITAL | Age: 78
End: 2018-09-21

## 2018-09-21 DIAGNOSIS — I10 ESSENTIAL HYPERTENSION, MALIGNANT: Primary | ICD-10-CM

## 2018-09-21 NOTE — TELEPHONE ENCOUNTER
[facility] (P: [phone]; F: [phone]) phoned requesting the last office note be faxed to them to process the script for his wheelchair

## 2018-09-27 ENCOUNTER — APPOINTMENT (OUTPATIENT)
Dept: LAB | Facility: HOSPITAL | Age: 78
End: 2018-09-27
Payer: COMMERCIAL

## 2018-09-27 DIAGNOSIS — M54.50 CHRONIC BILATERAL LOW BACK PAIN WITHOUT SCIATICA: ICD-10-CM

## 2018-09-27 DIAGNOSIS — I10 ESSENTIAL HYPERTENSION, MALIGNANT: ICD-10-CM

## 2018-09-27 DIAGNOSIS — G89.29 CHRONIC BILATERAL LOW BACK PAIN WITHOUT SCIATICA: ICD-10-CM

## 2018-09-27 LAB
ALBUMIN SERPL BCP-MCNC: 3.2 G/DL (ref 3.5–5)
ALP SERPL-CCNC: 62 U/L (ref 46–116)
ALT SERPL W P-5'-P-CCNC: 20 U/L (ref 12–78)
ANION GAP SERPL CALCULATED.3IONS-SCNC: 4 MMOL/L (ref 4–13)
AST SERPL W P-5'-P-CCNC: 17 U/L (ref 5–45)
BASOPHILS # BLD AUTO: 0.02 THOUSANDS/ΜL (ref 0–0.1)
BASOPHILS NFR BLD AUTO: 0 % (ref 0–1)
BILIRUB SERPL-MCNC: 0.69 MG/DL (ref 0.2–1)
BUN SERPL-MCNC: 17 MG/DL (ref 5–25)
CALCIUM SERPL-MCNC: 8.3 MG/DL (ref 8.3–10.1)
CHLORIDE SERPL-SCNC: 105 MMOL/L (ref 100–108)
CO2 SERPL-SCNC: 30 MMOL/L (ref 21–32)
CREAT SERPL-MCNC: 0.96 MG/DL (ref 0.6–1.3)
EOSINOPHIL # BLD AUTO: 0.25 THOUSAND/ΜL (ref 0–0.61)
EOSINOPHIL NFR BLD AUTO: 4 % (ref 0–6)
ERYTHROCYTE [DISTWIDTH] IN BLOOD BY AUTOMATED COUNT: 12.9 % (ref 11.6–15.1)
GFR SERPL CREATININE-BSD FRML MDRD: 76 ML/MIN/1.73SQ M
GLUCOSE P FAST SERPL-MCNC: 87 MG/DL (ref 65–99)
HCT VFR BLD AUTO: 44.2 % (ref 36.5–49.3)
HGB BLD-MCNC: 14.7 G/DL (ref 12–17)
IMM GRANULOCYTES # BLD AUTO: 0.02 THOUSAND/UL (ref 0–0.2)
IMM GRANULOCYTES NFR BLD AUTO: 0 % (ref 0–2)
LYMPHOCYTES # BLD AUTO: 1.64 THOUSANDS/ΜL (ref 0.6–4.47)
LYMPHOCYTES NFR BLD AUTO: 24 % (ref 14–44)
MCH RBC QN AUTO: 34.3 PG (ref 26.8–34.3)
MCHC RBC AUTO-ENTMCNC: 33.3 G/DL (ref 31.4–37.4)
MCV RBC AUTO: 103 FL (ref 82–98)
MONOCYTES # BLD AUTO: 0.67 THOUSAND/ΜL (ref 0.17–1.22)
MONOCYTES NFR BLD AUTO: 10 % (ref 4–12)
NEUTROPHILS # BLD AUTO: 4.17 THOUSANDS/ΜL (ref 1.85–7.62)
NEUTS SEG NFR BLD AUTO: 62 % (ref 43–75)
NRBC BLD AUTO-RTO: 0 /100 WBCS
PLATELET # BLD AUTO: 153 THOUSANDS/UL (ref 149–390)
PMV BLD AUTO: 11 FL (ref 8.9–12.7)
POTASSIUM SERPL-SCNC: 3.7 MMOL/L (ref 3.5–5.3)
PROT SERPL-MCNC: 7.1 G/DL (ref 6.4–8.2)
RBC # BLD AUTO: 4.29 MILLION/UL (ref 3.88–5.62)
SODIUM SERPL-SCNC: 139 MMOL/L (ref 136–145)
TSH SERPL DL<=0.05 MIU/L-ACNC: 2.45 UIU/ML (ref 0.36–3.74)
VENIPUNCTURE: NORMAL
WBC # BLD AUTO: 6.77 THOUSAND/UL (ref 4.31–10.16)

## 2018-09-27 PROCEDURE — 36415 COLL VENOUS BLD VENIPUNCTURE: CPT

## 2018-09-27 PROCEDURE — 85025 COMPLETE CBC W/AUTO DIFF WBC: CPT

## 2018-09-27 PROCEDURE — 80053 COMPREHEN METABOLIC PANEL: CPT

## 2018-09-27 PROCEDURE — 84443 ASSAY THYROID STIM HORMONE: CPT

## 2018-10-31 ENCOUNTER — OFFICE VISIT (OUTPATIENT)
Dept: FAMILY MEDICINE CLINIC | Facility: CLINIC | Age: 78
End: 2018-10-31
Payer: COMMERCIAL

## 2018-10-31 VITALS
DIASTOLIC BLOOD PRESSURE: 92 MMHG | WEIGHT: 176 LBS | HEIGHT: 64 IN | HEART RATE: 68 BPM | RESPIRATION RATE: 16 BRPM | BODY MASS INDEX: 30.05 KG/M2 | SYSTOLIC BLOOD PRESSURE: 140 MMHG | TEMPERATURE: 97.5 F

## 2018-10-31 DIAGNOSIS — G89.29 CHRONIC BILATERAL LOW BACK PAIN WITHOUT SCIATICA: ICD-10-CM

## 2018-10-31 DIAGNOSIS — G20 PARKINSON'S DISEASE (HCC): ICD-10-CM

## 2018-10-31 DIAGNOSIS — Z00.00 MEDICARE ANNUAL WELLNESS VISIT, SUBSEQUENT: ICD-10-CM

## 2018-10-31 DIAGNOSIS — J45.30 MILD PERSISTENT ASTHMA WITHOUT COMPLICATION: Primary | ICD-10-CM

## 2018-10-31 DIAGNOSIS — I10 ESSENTIAL HYPERTENSION: ICD-10-CM

## 2018-10-31 DIAGNOSIS — F41.8 DEPRESSION WITH ANXIETY: ICD-10-CM

## 2018-10-31 DIAGNOSIS — Z23 NEED FOR INFLUENZA VACCINATION: ICD-10-CM

## 2018-10-31 DIAGNOSIS — E78.2 MIXED HYPERLIPIDEMIA: ICD-10-CM

## 2018-10-31 DIAGNOSIS — M54.50 CHRONIC BILATERAL LOW BACK PAIN WITHOUT SCIATICA: ICD-10-CM

## 2018-10-31 PROCEDURE — G0439 PPPS, SUBSEQ VISIT: HCPCS | Performed by: FAMILY MEDICINE

## 2018-10-31 PROCEDURE — 1160F RVW MEDS BY RX/DR IN RCRD: CPT | Performed by: FAMILY MEDICINE

## 2018-10-31 PROCEDURE — 1170F FXNL STATUS ASSESSED: CPT

## 2018-10-31 PROCEDURE — 3008F BODY MASS INDEX DOCD: CPT | Performed by: FAMILY MEDICINE

## 2018-10-31 PROCEDURE — 90662 IIV NO PRSV INCREASED AG IM: CPT

## 2018-10-31 PROCEDURE — 4040F PNEUMOC VAC/ADMIN/RCVD: CPT

## 2018-10-31 PROCEDURE — 1125F AMNT PAIN NOTED PAIN PRSNT: CPT

## 2018-10-31 PROCEDURE — 1036F TOBACCO NON-USER: CPT | Performed by: FAMILY MEDICINE

## 2018-10-31 PROCEDURE — 1160F RVW MEDS BY RX/DR IN RCRD: CPT

## 2018-10-31 PROCEDURE — 1101F PT FALLS ASSESS-DOCD LE1/YR: CPT | Performed by: FAMILY MEDICINE

## 2018-10-31 PROCEDURE — 99214 OFFICE O/P EST MOD 30 MIN: CPT | Performed by: FAMILY MEDICINE

## 2018-10-31 PROCEDURE — 3725F SCREEN DEPRESSION PERFORMED: CPT

## 2018-10-31 PROCEDURE — G0008 ADMIN INFLUENZA VIRUS VAC: HCPCS

## 2018-10-31 NOTE — PROGRESS NOTES
Chief Complaint   Patient presents with   [facility] Wellness Visit     Annual wellness visit   Follow-up     6 month follow up  Assessment and Plan:    Problem List Items Addressed This Visit        Respiratory    Mild persistent asthma without complication - Primary       Cardiovascular and Mediastinum    Hypertension    Relevant Orders    Comprehensive metabolic panel       Nervous and Auditory    Parkinson's disease ([facility] [address] )    Relevant Orders    Comprehensive metabolic panel       Other    Depression with anxiety    Relevant Orders    Comprehensive metabolic panel    Mixed hyperlipidemia    Chronic bilateral low back pain without sciatica      Other Visit Diagnoses     Medicare annual wellness visit, subsequent        Need for influenza vaccination        Relevant Orders    influenza vaccine, 3645-1207, high-dose, PF 0 5 mL, for patients 65 yr+ (FLUZONE HIGH-DOSE) (Completed)        Health Maintenance Due   Topic Date Due    Medicare Annual Wellness Visit (AWV)  1940    SLP PLAN OF CARE  1940    DTaP,Tdap,and Td Vaccines (1 - Tdap) 03/24/2018    INFLUENZA VACCINE  07/01/2018         HPI:  Mally Nieto  is a 69 y o  male here for his Subsequent Wellness Visit  Patient Active Problem List   Diagnosis    Allergic rhinitis    BPH with obstruction/lower urinary tract symptoms    Depression with anxiety    Excessive crying, adult    Hypertension    Insomnia    Lewy body dementia without behavioral disturbance    Memory loss    Mixed hyperlipidemia    Overactive bladder    Parkinson's disease ([facility] [address] )    SOB (shortness of breath)    Mild persistent asthma without complication    Unsteady gait    Chronic bilateral low back pain without sciatica     MMSE 6/30 today  POA---wife  Living will---full code now         Past Medical History:   Diagnosis Date    Asthma     Depression with anxiety     last assessed 12/5/14    [facility])      Past Surgical History:   Procedure Laterality Date    CATARACT EXTRACTION  2003    COLONOSCOPY  2002    complete    HERNIA REPAIR  1998    PROSTATE BIOPSY      needle bx     Family History   Problem Relation Age of Onset    Coronary artery disease Father     Stroke Father      History   Smoking Status    Never Smoker   Smokeless Tobacco    Never Used     History   Alcohol Use    Yes     Comment: social      History   Drug Use No       Current Outpatient Prescriptions   Medication Sig Dispense Refill    albuterol (PROVENTIL HFA,VENTOLIN HFA) 90 mcg/act inhaler Inhale 1 puff every 6 (six) hours      ALPRAZolam (XANAX) 0 25 mg tablet 1 pill bid prn anxiety      ammonium lactate (LAC-HYDRIN) 12 % cream   1    beclomethasone (QVAR REDIHALER) 80 MCG/ACT inhaler Inhale 2 puffs 2 (two) times a day Rinse mouth after use   10 6 g 5    buPROPion (WELLBUTRIN XL) 150 mg 24 hr tablet Take 1 tablet by mouth daily      carbidopa-levodopa (SINEMET)  mg per tablet TK 1 T PO QID  0    Coenzyme Q10 (UBIDECARENONE) POWD Take 1 tablet by mouth      cycloSPORINE (RESTASIS) 0 05 % ophthalmic emulsion Apply 1 drop to eye every 12 (twelve) hours      dextromethorphan-quinidine (NUEDEXTA) 20-10 mg Take 1 capsule by mouth daily      donepezil (ARICEPT) 10 mg tablet daily at bedtime    3    DYMISTA 137-50 MCG/ACT SUSP INSTIL 1 SPRAY IN EACH NOSTRIL TWICE A DAY AS NEEDED  5    finasteride (PROSCAR) 5 mg tablet Take 1 tablet (5 mg total) by mouth daily 90 tablet 3    ibuprofen (MOTRIN) 600 mg tablet Take 1 tablet (600 mg total) by mouth every 8 (eight) hours as needed for mild pain for up to 10 days 30 tablet 3    Mirabegron ER (MYRBETRIQ) 50 MG TB24 Take 1 tablet (50 mg total) by mouth daily 30 tablet 11    Multiple Vitamins-Minerals (CENTRUM SILVER 50+MEN PO) Take 1 tablet by mouth daily      saccharomyces boulardii (FLORASTOR) 250 mg capsule Take 1 tablet by mouth      tamsulosin (FLOMAX) 0 4 mg Take 1 capsule (0 4 mg total) by mouth daily 90 capsule 3    triamcinolone (KENALOG) 0 1 % lotion Apply topically 3 (three) times a day       No current facility-administered medications for this visit  No Known Allergies  Immunization History   Administered Date(s) Administered    Influenza 12/14/2005, 10/29/2007, 10/01/2008, 10/05/2016, 10/05/2017    Influenza Split High Dose Preservative Free IM 10/06/2014, 10/06/2015, 10/05/2016, 10/05/2017    Influenza TIV (IM) 10/31/2011, 10/12/2012, 10/11/2013    Influenza, high dose seasonal 0 5 mL 10/31/2018    Pneumococcal Conjugate 13-Valent 04/06/2015    Pneumococcal Polysaccharide PPV23 10/01/2009    TD (adult) Preservative Free 03/23/2018    Td (adult), adsorbed 10/01/2009       Patient Care Team:  Mordechai Nyhan, MD as PCP - Tobin Farmer, MD Mordechai Nyhan, MD    Medicare Screening Tests and Risk Assessments:  Last Medicare Wellness visit information reviewed, patient interviewed, no change since last AWV  Health Risk Assessment:  Patient rates overall health as fair  Patient feels that their physical health rating is Slightly worse  Eyesight was rated as Slightly worse  Hearing was rated as Slightly worse  Patient feels that their emotional and mental health rating is Same  Pain experienced by patient in the last 7 days has been None  Emotional/Mental Health:  Patient has been feeling nervous/anxious  Broken Bones/Falls: Fall Risk Assessment:    In the past year, patient has experienced: No history of falling in past year          Bladder/Bowel:  Patient has leaked urine accidently in the last six months  Patient reports no loss of bowel control  Immunizations:  Patient has had a flu vaccination within the last year  Patient has received a pneumonia shot  Patient has not received a shingles shot  Patient has received tetanus/diphtheria shot  Home Safety:  Patient has trouble with stairs inside or outside of their home     Patient currently reports that there are no safety hazards present in home, working smoke alarms, no working carbon monoxide detectors  Preventative Screenings:   no prostate cancer screen performed, no colon cancer screen completed, no cholesterol screen completed, no glaucoma eye exam completed    Nutrition:  Current diet: Regular and Limited junk food with servings of the following:    Medications:  Patient is not currently taking any over-the-counter supplements  Patient is able to manage medications  Lifestyle Choices:  Patient reports no tobacco use  Patient has not smoked or used tobacco in the past   Patient reports no alcohol use  Patient does not drive a vehicle  Patient wears seat belt  Current level of exercise of physical activity described by patient as: Minimal         Activities of Daily Living:  Unable to get out of bed by his or her self, unable to dress self, unable to make own meals, unable to do own shopping, unable to bathe self, unable to do laundry/housekeeping, unable to manage own money and other related tasks    Previous Hospitalizations:  No hospitalization or ED visit in past 12 months        Advanced Directives:  Patient has decided on a power of   Patient has spoken to designated power of   Patient has completed advanced directive  Preventative Screening/Counseling:      Cardiovascular:      General: Risks and Benefits Discussed and Screening Current          Diabetes:      General: Risks and Benefits Discussed and Screening Current          Colorectal Cancer:      General: Screening Not Indicated          Advanced Directives:   Patient has living will for healthcare, patient has an advanced directive  End of life assessment reviewed with patient  Provider agrees with end of life decisions        Immunizations:      Influenza: Risks & Benefits Discussed and Influenza Due Today      Pneumococcal: Risks & Benefits Discussed and Lifetime Vaccine Completed      Shingrix: Risks & Benefits Discussed and Shingrix Vaccine Needed Today      TDAP: Risks & Benefits Discussed and Tdap Vaccine UTD

## 2018-10-31 NOTE — PROGRESS NOTES
Chief Complaint   Patient presents with   [facility] Wellness Visit     Annual wellness visit   Follow-up     6 month follow up  Health Maintenance   Topic Date Due   [facility] Annual Wellness Visit (AWV)  68/29/1307    SLP PLAN OF CARE  1940    DTaP,Tdap,and Td Vaccines (1 - Tdap) 03/24/2018    INFLUENZA VACCINE  07/01/2018    Depression Screening PHQ  04/12/2019    Fall Risk  10/31/2019    Pneumococcal PPSV23/PCV13 65+ Years / Low and Medium Risk  Completed     Assessment/Plan:  Reviewed lab in 9/2018  TSH normal  CBC ok  CMP ok    Asthma---controlled  Continue Qvar  Use proair as needed  Chronic lower back pain---use ibuprofen as needed  SE educated pt  Take it with food  FU physical therapy  HTN---BP elevated in office today  Pt denies symptoms like headache, vision change, SOB or chest pain  DASH diet  Check BP at home 2/day and call office if BP always >140/90  Hyperlipidemia---Low fat diet  Parkinson---FU neurology  Depression/anxiety/dementia---FU neurology  Continue nuedexta and wellbutrin and aricept  Give flu shot today  Got [id number] 4/2015  Got pneumovax in 2009 at age of 77  Got Td 2018  Will call insurance for coverage of shingrix  FU urology for PSA yearly  Colonoscopy in 2002  Refused more colonoscopy  Signed paper  RTO in 6 months with labs  Diagnoses and all orders for this visit:    Mild persistent asthma without complication    Chronic bilateral low back pain without sciatica    Essential hypertension  -     Comprehensive metabolic panel; Future    Mixed hyperlipidemia    Parkinson's disease ([facility] [address] )  -     Comprehensive metabolic panel; Future    Depression with anxiety  -     Comprehensive metabolic panel; Future    Medicare annual wellness visit, subsequent    Need for influenza vaccination  -     influenza vaccine, 1984-2270, high-dose, PF 0 5 mL, for patients 65 yr+ (FLUZONE HIGH-DOSE)          Subjective:      Patient ID: Linda Cabrera  is a 69 y o  male  HPI    Pt is here with his wife  Asthma/Allergic Rhinitis---Uses the QVAR, dymista  Stable  Does not need rescue inhaler recently  Follows ENT   [facility] and allergist Dr Tj Davidson before, not any more  Had nasal polys surgery  Chronic lower back pain---use ibuprofen as needed  Physical therapy helped  HTN---Diet control  At home 130/80 per wife  Not on any meds  Hyperlipidemia---Diet control  Not on any meds       Parkinson's disease/Memory loss/excessive crying---[facility] neurology Dr Amilcar Meyer Q 4 months  On donepezil and wife noticed it helped  He is on sinemet and nuedexta also       Depression/Anxiety---On wellbutrin 150mg daily now  Wife states Dr Amilcar Meyer stopped buspiron 10mg since 1/2018        BPH---Follows with urologist Dr Anderson Li yearly  On tamsulosin and finasteride now  Nocturia 1-2/night       Lives with wife  Needs help for ADL's bathing, dressing etc  Nurse will be help in their house 3/week  No drive  Denies recent falls           The following portions of the patient's history were reviewed and updated as appropriate: allergies, current medications, past family history, past medical history, past social history, past surgical history and problem list     Review of Systems   Constitutional: Negative for appetite change, chills and fever  HENT: Negative for congestion, ear pain, sinus pain and sore throat  Eyes: Negative for discharge and itching  Respiratory: Negative for apnea, cough, chest tightness, shortness of breath and wheezing  Cardiovascular: Negative for chest pain, palpitations and leg swelling  Gastrointestinal: Negative for abdominal pain, anal bleeding, constipation, diarrhea, nausea and vomiting  Endocrine: Negative for cold intolerance, heat intolerance and polyuria  Genitourinary: Negative for difficulty urinating and dysuria  Musculoskeletal: Negative for arthralgias, back pain and myalgias  Skin: Negative for rash  Neurological: Negative for dizziness and headaches  Psychiatric/Behavioral: Negative for agitation  Objective:      /92 (BP Location: Left arm, Patient Position: Sitting, Cuff Size: Standard)   Pulse 68   Temp 97 5 °F (36 4 °C) (Tympanic)   Resp 16   Ht 5' 3 5" (1 613 m)   Wt 79 8 kg (176 lb)   BMI 30 69 kg/m²          Physical Exam   Constitutional: He appears well-developed  HENT:   Head: Normocephalic and atraumatic  Eyes: Pupils are equal, round, and reactive to light  Conjunctivae are normal    Neck: Normal range of motion  Neck supple  Cardiovascular: Normal rate, regular rhythm, normal heart sounds and intact distal pulses  Pulmonary/Chest: Effort normal and breath sounds normal    Abdominal: Soft  Bowel sounds are normal    Musculoskeletal:   Unsteady gait   Neurological: He is alert

## 2018-12-11 ENCOUNTER — PATIENT OUTREACH (OUTPATIENT)
Dept: FAMILY MEDICINE CLINIC | Facility: CLINIC | Age: 78
End: 2018-12-11

## 2018-12-11 NOTE — PROGRESS NOTES
Maribel Oviedo called me requesting hospital bed for her   I will send message to pcp office for an order  She is still thinking about Palliative care  She will call back and let me know

## 2018-12-12 DIAGNOSIS — R26.81 UNSTEADY GAIT: ICD-10-CM

## 2018-12-12 DIAGNOSIS — G89.29 CHRONIC BILATERAL LOW BACK PAIN WITHOUT SCIATICA: ICD-10-CM

## 2018-12-12 DIAGNOSIS — G31.83 LEWY BODY DEMENTIA WITHOUT BEHAVIORAL DISTURBANCE (HCC): Primary | ICD-10-CM

## 2018-12-12 DIAGNOSIS — M54.50 CHRONIC BILATERAL LOW BACK PAIN WITHOUT SCIATICA: ICD-10-CM

## 2018-12-12 DIAGNOSIS — G20 PARKINSON'S DISEASE (HCC): ICD-10-CM

## 2018-12-12 DIAGNOSIS — F02.80 LEWY BODY DEMENTIA WITHOUT BEHAVIORAL DISTURBANCE (HCC): Primary | ICD-10-CM

## 2018-12-14 ENCOUNTER — PATIENT OUTREACH (OUTPATIENT)
Dept: FAMILY MEDICINE CLINIC | Facility: CLINIC | Age: 78
End: 2018-12-14

## 2018-12-14 NOTE — PROGRESS NOTES
Returned phone call to Tristen ARTHUR  She did say she cancelled the [address] program thru [employer]  She would like a referral to Palliative Care  I will put message thru to Dr Nazia Luu for order  She has not heard anything yet for hospital bed

## 2018-12-17 ENCOUNTER — TELEPHONE (OUTPATIENT)
Dept: UROLOGY | Facility: AMBULATORY SURGERY CENTER | Age: 78
End: 2018-12-17

## 2018-12-17 ENCOUNTER — PATIENT OUTREACH (OUTPATIENT)
Dept: FAMILY MEDICINE CLINIC | Facility: CLINIC | Age: 78
End: 2018-12-17

## 2018-12-17 DIAGNOSIS — R26.81 UNSTEADY GAIT: ICD-10-CM

## 2018-12-17 DIAGNOSIS — G20 PARKINSON'S DISEASE (HCC): ICD-10-CM

## 2018-12-17 DIAGNOSIS — F02.80 LEWY BODY DEMENTIA WITHOUT BEHAVIORAL DISTURBANCE (HCC): Primary | ICD-10-CM

## 2018-12-17 DIAGNOSIS — G31.83 LEWY BODY DEMENTIA WITHOUT BEHAVIORAL DISTURBANCE (HCC): Primary | ICD-10-CM

## 2018-12-17 NOTE — PROGRESS NOTES
Wife Skip Crawford called checking on order for Palliative care order  and stated she received information I sent her on [employer]  I told her I mailed her a flyer also on Palliative care program on 12/14/18  I will check about Palliative order and let her know

## 2018-12-17 NOTE — TELEPHONE ENCOUNTER
Patient wife Laverda Nixon called to update our records with new pharmacy [facility] [address]

## 2018-12-19 ENCOUNTER — PATIENT OUTREACH (OUTPATIENT)
Dept: FAMILY MEDICINE CLINIC | Facility: CLINIC | Age: 78
End: 2018-12-19

## 2018-12-19 NOTE — PROGRESS NOTES
Spoke with [employer] medical they did receive the hospital bed order and they are waiting to hear from insurance company  I will let patients wife know

## 2018-12-19 NOTE — PROGRESS NOTES
Spoke with wife to let her know hospital bed paperwork is at [employer] and they are waiting to hear back

## 2018-12-27 ENCOUNTER — APPOINTMENT (EMERGENCY)
Dept: RADIOLOGY | Facility: HOSPITAL | Age: 78
End: 2018-12-27
Payer: COMMERCIAL

## 2018-12-27 ENCOUNTER — HOSPITAL ENCOUNTER (EMERGENCY)
Facility: HOSPITAL | Age: 78
Discharge: HOME | End: 2018-12-27
Attending: EMERGENCY MEDICINE
Payer: COMMERCIAL

## 2018-12-27 VITALS
HEART RATE: 72 BPM | BODY MASS INDEX: 29.64 KG/M2 | RESPIRATION RATE: 20 BRPM | SYSTOLIC BLOOD PRESSURE: 147 MMHG | DIASTOLIC BLOOD PRESSURE: 92 MMHG | WEIGHT: 170 LBS | TEMPERATURE: 97.3 F | OXYGEN SATURATION: 96 %

## 2018-12-27 DIAGNOSIS — F41.8 DEPRESSION WITH ANXIETY: Primary | ICD-10-CM

## 2018-12-27 DIAGNOSIS — G31.83 LEWY BODY DEMENTIA WITHOUT BEHAVIORAL DISTURBANCE (HCC): ICD-10-CM

## 2018-12-27 DIAGNOSIS — F02.80 LEWY BODY DEMENTIA WITHOUT BEHAVIORAL DISTURBANCE (HCC): ICD-10-CM

## 2018-12-27 DIAGNOSIS — Z87.898 HISTORY OF CHEST PAIN: ICD-10-CM

## 2018-12-27 LAB
ALBUMIN SERPL BCP-MCNC: 3.6 G/DL (ref 3.5–5)
ALP SERPL-CCNC: 76 U/L (ref 46–116)
ALT SERPL W P-5'-P-CCNC: 12 U/L (ref 12–78)
ANION GAP SERPL CALCULATED.3IONS-SCNC: 2 MMOL/L (ref 4–13)
AST SERPL W P-5'-P-CCNC: 20 U/L (ref 5–45)
ATRIAL RATE: 79 BPM
BASOPHILS # BLD AUTO: 0.04 THOUSANDS/ΜL (ref 0–0.1)
BASOPHILS NFR BLD AUTO: 0 % (ref 0–1)
BILIRUB SERPL-MCNC: 0.48 MG/DL (ref 0.2–1)
BUN SERPL-MCNC: 16 MG/DL (ref 5–25)
CALCIUM SERPL-MCNC: 8.9 MG/DL (ref 8.3–10.1)
CHLORIDE SERPL-SCNC: 104 MMOL/L (ref 100–108)
CO2 SERPL-SCNC: 31 MMOL/L (ref 21–32)
CREAT SERPL-MCNC: 1.03 MG/DL (ref 0.6–1.3)
EOSINOPHIL # BLD AUTO: 0.3 THOUSAND/ΜL (ref 0–0.61)
EOSINOPHIL NFR BLD AUTO: 3 % (ref 0–6)
ERYTHROCYTE [DISTWIDTH] IN BLOOD BY AUTOMATED COUNT: 12.6 % (ref 11.6–15.1)
GFR SERPL CREATININE-BSD FRML MDRD: 70 ML/MIN/1.73SQ M
GLUCOSE SERPL-MCNC: 92 MG/DL (ref 65–140)
HCT VFR BLD AUTO: 47.9 % (ref 36.5–49.3)
HGB BLD-MCNC: 15.7 G/DL (ref 12–17)
IMM GRANULOCYTES # BLD AUTO: 0.02 THOUSAND/UL (ref 0–0.2)
IMM GRANULOCYTES NFR BLD AUTO: 0 % (ref 0–2)
LYMPHOCYTES # BLD AUTO: 1.66 THOUSANDS/ΜL (ref 0.6–4.47)
LYMPHOCYTES NFR BLD AUTO: 19 % (ref 14–44)
MCH RBC QN AUTO: 33.6 PG (ref 26.8–34.3)
MCHC RBC AUTO-ENTMCNC: 32.8 G/DL (ref 31.4–37.4)
MCV RBC AUTO: 103 FL (ref 82–98)
MONOCYTES # BLD AUTO: 0.96 THOUSAND/ΜL (ref 0.17–1.22)
MONOCYTES NFR BLD AUTO: 11 % (ref 4–12)
NEUTROPHILS # BLD AUTO: 5.97 THOUSANDS/ΜL (ref 1.85–7.62)
NEUTS SEG NFR BLD AUTO: 67 % (ref 43–75)
NRBC BLD AUTO-RTO: 0 /100 WBCS
P AXIS: 42 DEGREES
PLATELET # BLD AUTO: 176 THOUSANDS/UL (ref 149–390)
PMV BLD AUTO: 10.4 FL (ref 8.9–12.7)
POTASSIUM SERPL-SCNC: 3.7 MMOL/L (ref 3.5–5.3)
PR INTERVAL: 156 MS
PROT SERPL-MCNC: 7.9 G/DL (ref 6.4–8.2)
QRS AXIS: -67 DEGREES
QRSD INTERVAL: 146 MS
QT INTERVAL: 428 MS
QTC INTERVAL: 490 MS
RBC # BLD AUTO: 4.67 MILLION/UL (ref 3.88–5.62)
SODIUM SERPL-SCNC: 137 MMOL/L (ref 136–145)
T WAVE AXIS: 72 DEGREES
TROPONIN I SERPL-MCNC: <0.02 NG/ML
TROPONIN I SERPL-MCNC: <0.02 NG/ML
VENTRICULAR RATE: 79 BPM
WBC # BLD AUTO: 8.95 THOUSAND/UL (ref 4.31–10.16)

## 2018-12-27 PROCEDURE — 71046 X-RAY EXAM CHEST 2 VIEWS: CPT

## 2018-12-27 PROCEDURE — 85025 COMPLETE CBC W/AUTO DIFF WBC: CPT | Performed by: EMERGENCY MEDICINE

## 2018-12-27 PROCEDURE — 93010 ELECTROCARDIOGRAM REPORT: CPT | Performed by: INTERNAL MEDICINE

## 2018-12-27 PROCEDURE — 80053 COMPREHEN METABOLIC PANEL: CPT | Performed by: EMERGENCY MEDICINE

## 2018-12-27 PROCEDURE — 99285 EMERGENCY DEPT VISIT HI MDM: CPT

## 2018-12-27 PROCEDURE — 36415 COLL VENOUS BLD VENIPUNCTURE: CPT | Performed by: EMERGENCY MEDICINE

## 2018-12-27 PROCEDURE — 84484 ASSAY OF TROPONIN QUANT: CPT | Performed by: EMERGENCY MEDICINE

## 2018-12-27 PROCEDURE — 99285 EMERGENCY DEPT VISIT HI MDM: CPT | Performed by: INTERNAL MEDICINE

## 2018-12-27 PROCEDURE — 93005 ELECTROCARDIOGRAM TRACING: CPT

## 2018-12-27 NOTE — DISCHARGE INSTRUCTIONS
Chest Pain   WHAT YOU NEED TO KNOW:   Chest pain can be caused by a range of conditions, from not serious to life-threatening  Chest pain can be a symptom of a digestive problem, such as acid reflux or a stomach ulcer  An anxiety attack or a strong emotion, such as anger, can also cause chest pain  Infection, inflammation, or a fracture in the bones or cartilage in your chest can cause pain or discomfort  Sometimes chest pain or pressure is caused by poor blood flow to your heart (angina)  Chest pain may also be caused by life-threatening conditions such as a heart attack or blood clot in your lungs  DISCHARGE INSTRUCTIONS:   Call 911 if:   · You have any of the following signs of a heart attack:      ¨ Squeezing, pressure, or pain in your chest that lasts longer than 5 minutes or returns    ¨ Discomfort or pain in your back, neck, jaw, stomach, or arm     ¨ Trouble breathing    ¨ Nausea or vomiting    ¨ Lightheadedness or a sudden cold sweat, especially with chest pain or trouble breathing    Return to the emergency department if:   · You have chest discomfort that gets worse, even with medicine  · You cough or vomit blood  · Your bowel movements are black or bloody  · You cannot stop vomiting, or it hurts to swallow  Contact your healthcare provider if:   · You have questions or concerns about your condition or care  Medicines:   · Medicines  may be given to treat the cause of your chest pain  Examples include pain medicine, anxiety medicine, or medicines to increase blood flow to your heart  · Do not take certain medicines without asking your healthcare provider first   These include NSAIDs, herbal or vitamin supplements, or hormones (estrogen or progestin)  · Take your medicine as directed  Contact your healthcare provider if you think your medicine is not helping or if you have side effects  Tell him or her if you are allergic to any medicine   Keep a list of the medicines, vitamins, and herbs you take  Include the amounts, and when and why you take them  Bring the list or the pill bottles to follow-up visits  Carry your medicine list with you in case of an emergency  Follow up with your healthcare provider within 72 hours, or as directed: You may need to return for more tests to find the cause of your chest pain  You may be referred to a specialist, such as a cardiologist or gastroenterologist  Write down your questions so you remember to ask them during your visits  Healthy living tips: The following are general healthy guidelines  If your chest pain is caused by a heart problem, your healthcare provider will give you specific guidelines to follow  · Do not smoke  Nicotine and other chemicals in cigarettes and cigars can cause lung and heart damage  Ask your healthcare provider for information if you currently smoke and need help to quit  E-cigarettes or smokeless tobacco still contain nicotine  Talk to your healthcare provider before you use these products  · Eat a variety of healthy, low-fat foods  Healthy foods include fruits, vegetables, whole-grain breads, low-fat dairy products, beans, lean meats, and fish  Ask for more information about a heart healthy diet  · Ask about activity  Your healthcare provider will tell you which activities to limit or avoid  Ask when you can drive, return to work, and have sex  Ask about the best exercise plan for you  · Maintain a healthy weight  Ask your healthcare provider how much you should weigh  Ask him or her to help you create a weight loss plan if you are overweight  · Get the flu and pneumonia vaccines  All adults should get the influenza (flu) vaccine  Get it every year as soon as it becomes available  The pneumococcal vaccine is given to adults aged 72 years or older  The vaccine is given every 5 years to prevent pneumococcal disease, such as pneumonia    © 2017 [address] [employer] Information is for End User's use only and may not be sold, redistributed or otherwise used for commercial purposes  All illustrations and images included in CareNotes® are the copyrighted property of [employer]  or Vinicius Khan  The above information is an  only  It is not intended as medical advice for individual conditions or treatments  Talk to your doctor, nurse or pharmacist before following any medical regimen to see if it is safe and effective for you

## 2018-12-27 NOTE — ED PROVIDER NOTES
History  Chief Complaint   Patient presents with    Chest Pain     Per EMS, Pt comes from home  Pt's dementia is in his normal confused state  Pt denies pain, but had CP and SOB for about 3-4 hours then pain stopped  67-year-old male presents emergency department for evaluation of chest pain and dyspnea that began around 6 o'clock this morning  Patient woke with the symptoms which lasted until just prior to arrival   Patient has Lewy body dementia and is hard of hearing, history is limited  Per family patient has had no recent trauma / injury, cough/cold symptoms, fever/ chills, headache, vision changes, neck/ back pain, abdominal pain, nausea /vomiting / diarrhea, dizziness / lightheadedness, rash, unilateral lower extremity swelling or focal neuro deficits  Patient does occasionally have bilateral lower extremity swelling of which she uses compression stockings, no swelling noted at time of exam    Patient has past medical history of hypertension, hyperlipidemia, allergic rhinitis, asthma, BPH, insomnia, depression, overactive bladder, unsteady gait and chronic bilateral low back pain  Pt has a past surgical history that includes Cataract extraction (); Colonoscopy (); Hernia repair (); and Prostate biopsy  Pt is a nonsmoker, social ETOH use, no recreational drug use  No recent travel or known sick contacts  No new medications or changes in medications, no interventions prior to arrival, pt verbalizes no complaints at time of exam                    Prior to Admission Medications   Prescriptions Last Dose Informant Patient Reported? Taking?    ALPRAZolam (XANAX) 0 25 mg tablet Past Week at Unknown time  Yes Yes   Si pill bid prn anxiety   Coenzyme Q10 (UBIDECARENONE) POWD 2018 at Unknown time Spouse/Significant Other Yes Yes   Sig: Take 1 tablet by mouth   DYMISTA 137-50 MCG/ACT SUSP 2018 at Unknown time Spouse/Significant Other Yes Yes   Sig: INSTIL 1 SPRAY IN EACH NOSTRIL TWICE A DAY AS NEEDED   Mirabegron ER (MYRBETRIQ) 50 MG TB24 Not Taking at Unknown time Spouse/Significant Other No No   Sig: Take 1 tablet (50 mg total) by mouth daily   Patient not taking: Reported on 12/27/2018    Multiple Vitamins-Minerals (CENTRUM SILVER 50+MEN PO) 12/26/2018 at Unknown time Spouse/Significant Other Yes Yes   Sig: Take 1 tablet by mouth daily   albuterol (PROVENTIL HFA,VENTOLIN HFA) 90 mcg/act inhaler Unknown at Unknown time Spouse/Significant Other Yes No   Sig: Inhale 1 puff every 6 (six) hours   ammonium lactate (LAC-HYDRIN) 12 % cream Not Taking at Unknown time Spouse/Significant Other Yes No   beclomethasone (QVAR REDIHALER) 80 MCG/ACT inhaler 12/26/2018 at Unknown time  No Yes   Sig: Inhale 2 puffs 2 (two) times a day Rinse mouth after use     buPROPion (WELLBUTRIN XL) 150 mg 24 hr tablet 12/27/2018 at Unknown time Spouse/Significant Other Yes Yes   Sig: Take 1 tablet by mouth daily   carbidopa-levodopa (SINEMET)  mg per tablet 12/27/2018 at Unknown time Spouse/Significant Other Yes Yes   Sig: TK 1 T PO QID   cycloSPORINE (RESTASIS) 0 05 % ophthalmic emulsion 12/26/2018 at Unknown time Spouse/Significant Other Yes Yes   Sig: Apply 1 drop to eye every 12 (twelve) hours   dextromethorphan-quinidine (NUEDEXTA) 20-10 mg 12/27/2018 at Unknown time Spouse/Significant Other Yes Yes   Sig: Take 1 capsule by mouth daily   donepezil (ARICEPT) 10 mg tablet 12/26/2018 at Unknown time Spouse/Significant Other Yes Yes   Sig: daily at bedtime     finasteride (PROSCAR) 5 mg tablet 12/27/2018 at Unknown time Spouse/Significant Other No Yes   Sig: Take 1 tablet (5 mg total) by mouth daily   ibuprofen (MOTRIN) 600 mg tablet   No No   Sig: Take 1 tablet (600 mg total) by mouth every 8 (eight) hours as needed for mild pain for up to 10 days   saccharomyces boulardii (FLORASTOR) 250 mg capsule 12/26/2018 at Unknown time Spouse/Significant Other Yes Yes   Sig: Take 1 tablet by mouth   tamsulosin (FLOMAX) 0 4 mg 12/27/2018 at Unknown time Spouse/Significant Other No Yes   Sig: Take 1 capsule (0 4 mg total) by mouth daily   triamcinolone (KENALOG) 0 1 % lotion Not Taking at Unknown time Spouse/Significant Other Yes No   Sig: Apply topically 3 (three) times a day      Facility-Administered Medications: None       Past Medical History:   Diagnosis Date    Asthma     Depression with anxiety     last assessed 12/5/14    [facility])        Past Surgical History:   Procedure Laterality Date    CATARACT EXTRACTION  2003    COLONOSCOPY  2002    complete    HERNIA REPAIR  1998    PROSTATE BIOPSY      needle bx       Family History   Problem Relation Age of Onset    Coronary artery disease Father     Stroke Father      I have reviewed and agree with the history as documented  Social History   Substance Use Topics    Smoking status: Never Smoker    Smokeless tobacco: Never Used    Alcohol use Yes      Comment: social        Review of Systems   Unable to perform ROS: Dementia   Constitutional: Negative for fever  HENT: Negative for congestion, rhinorrhea and sore throat  Eyes: Negative for pain  Respiratory: Positive for shortness of breath (asymptomatic at time of exam)  Negative for cough, wheezing and stridor  Cardiovascular: Positive for chest pain (asymptomatic at time of exam)  Negative for leg swelling  Gastrointestinal: Negative for abdominal pain, blood in stool, diarrhea, nausea and vomiting  Genitourinary: Negative for dysuria and hematuria  Musculoskeletal: Negative for back pain and neck pain  Skin: Negative for rash  Neurological: Negative for dizziness, seizures, syncope and headaches  Psychiatric/Behavioral: Positive for confusion (baseline dementia)         Physical Exam  ED Triage Vitals [12/27/18 0859]   Temperature Pulse Respirations Blood Pressure SpO2   97 5 °F (36 4 °C) 77 16 (!) 184/87 95 %      Temp Source Heart Rate Source Patient Position - Orthostatic VS BP Location FiO2 (%)   Oral Monitor Lying Right arm --      Pain Score       No Pain           Orthostatic Vital Signs  Vitals:    12/27/18 1135 12/27/18 1230 12/27/18 1329 12/27/18 1430   BP: 167/83 (!) 194/88 (!) 198/83 147/92   Pulse: 60 79 87 72   Patient Position - Orthostatic VS: Lying  Lying Lying       Physical Exam   Constitutional: He is oriented to person, place, and time  He appears well-developed and well-nourished  No distress  Pleasantly confused   HENT:   Head: Normocephalic and atraumatic  Nose: Nose normal    Mouth/Throat: Oropharynx is clear and moist  No oropharyngeal exudate  Eyes: Pupils are equal, round, and reactive to light  Conjunctivae and EOM are normal  Right eye exhibits no discharge  Left eye exhibits no discharge  Neck: Normal range of motion  Neck supple  No JVD present  No tracheal deviation present  Cardiovascular: Normal rate, regular rhythm, normal heart sounds and intact distal pulses  Exam reveals no gallop and no friction rub  No murmur heard  Pulmonary/Chest: Effort normal and breath sounds normal  No stridor  No respiratory distress  He has no wheezes  He has no rales  He exhibits no tenderness  Abdominal: Soft  Bowel sounds are normal  He exhibits no distension  There is no tenderness  There is no rebound and no guarding  Musculoskeletal: Normal range of motion  He exhibits no edema, tenderness or deformity  Neurological: He is alert and oriented to person, place, and time  No cranial nerve deficit  Skin: Skin is warm and dry  Capillary refill takes less than 2 seconds  No rash noted  He is not diaphoretic  Psychiatric: He has a normal mood and affect  Nursing note and vitals reviewed        ED Medications  Medications - No data to display    Diagnostic Studies  Results Reviewed     Procedure Component Value Units Date/Time    Troponin I [[id number]]  (Normal) Collected:  12/27/18 1259    Lab Status:  Final result Specimen:  Blood from Arm, Right Updated:  12/27/18 1324     Troponin I <0 02 ng/mL     Troponin I [[id number]]  (Normal) Collected:  12/27/18 0907    Lab Status:  Final result Specimen:  Blood from Arm, Left Updated:  12/27/18 0936     Troponin I <0 02 ng/mL     Comprehensive metabolic panel [[id number]]  (Abnormal) Collected:  12/27/18 0907    Lab Status:  Final result Specimen:  Blood from Arm, Left Updated:  12/27/18 8289     Sodium 137 mmol/L      Potassium 3 7 mmol/L      Chloride 104 mmol/L      CO2 31 mmol/L      ANION GAP 2 (L) mmol/L      BUN 16 mg/dL      Creatinine 1 03 mg/dL      Glucose 92 mg/dL      Calcium 8 9 mg/dL      AST 20 U/L      ALT 12 U/L      Alkaline Phosphatase 76 U/L      Total Protein 7 9 g/dL      Albumin 3 6 g/dL      Total Bilirubin 0 48 mg/dL      eGFR 70 ml/min/1 73sq m     Narrative:         National Kidney Disease Education Program recommendations are as follows:  GFR calculation is accurate only with a steady state creatinine  Chronic Kidney disease less than 60 ml/min/1 73 sq  meters  Kidney failure less than 15 ml/min/1 73 sq  meters      CBC and differential [[id number]]  (Abnormal) Collected:  12/27/18 0907    Lab Status:  Final result Specimen:  Blood from Arm, Left Updated:  12/27/18 0916     WBC 8 95 Thousand/uL      RBC 4 67 Million/uL      Hemoglobin 15 7 g/dL      Hematocrit 47 9 %       (H) fL      MCH 33 6 pg      MCHC 32 8 g/dL      RDW 12 6 %      MPV 10 4 fL      Platelets 559 Thousands/uL      nRBC 0 /100 WBCs      Neutrophils Relative 67 %      Immat GRANS % 0 %      Lymphocytes Relative 19 %      Monocytes Relative 11 %      Eosinophils Relative 3 %      Basophils Relative 0 %      Neutrophils Absolute 5 97 Thousands/µL      Immature Grans Absolute 0 02 Thousand/uL      Lymphocytes Absolute 1 66 Thousands/µL      Monocytes Absolute 0 96 Thousand/µL      Eosinophils Absolute 0 30 Thousand/µL      Basophils Absolute 0 04 Thousands/µL                  X-ray chest 2 views   Final Result by Destiny Rock DO (12/27 0945)   Diminished inspiration  Mild vascular congestion  Workstation performed: [id number]               Procedures  ECG 12 Lead Documentation  Date/Time: 12/27/2018 9:01 AM  Performed by: Natalia Webb  Authorized by: Alexandra MORELOS     ECG reviewed by me, the ED Provider: yes    Patient location:  ED  Previous ECG:     Previous ECG:  Compared to current    Similarity:  No change  Interpretation:     Interpretation: abnormal    Quality:     Tracing quality:  Limited by artifact  Rhythm:     Rhythm: sinus rhythm    Ectopy:     Ectopy: none    QRS:     QRS axis:  Left    QRS intervals: Wide  ST segments:     ST segments:  Non-specific  T waves:     T waves: inverted      Inverted:  V1 and V2          Phone Consults  ED Phone Contact    ED Course         HEART Risk Score      Most Recent Value   History  0 Filed at: 12/27/2018 1047   ECG  1 Filed at: 12/27/2018 1047   Age  2 Filed at: 12/27/2018 1047   Risk Factors  1 Filed at: 12/27/2018 1047   Troponin  0 Filed at: 12/27/2018 1047   Heart Score Risk Calculator   History  0 Filed at: 12/27/2018 1047   ECG  1 Filed at: 12/27/2018 1047   Age  2 Filed at: 12/27/2018 1047   Risk Factors  1 Filed at: 12/27/2018 1047   Troponin  0 Filed at: 12/27/2018 1047   HEART Score  4 Filed at: 12/27/2018 1047   HEART Score  4 Filed at: 12/27/2018 1047          Discussion with family results of tests and admission to the hospital for further evaluation  Family prefer for patient to be discharged from hospital (want comfort care at home) but ask for palliative evaluation as they have attempted multiple times to see them outpatient to establish PALS at home program and have been unsuccessful  Will obtain delta troponin and consult palliative medicine  Patient remained asymptomatic throughout ED course with normal delta troponin   Family met with palliative care and started paperwork for POLST and PALS at home program   Discharge instructions given including medications, follow-up and return precautions with understanding verbalized  MDM  CritCare Time    Disposition  Final diagnoses:   Depression with anxiety   Lewy body dementia without behavioral disturbance   History of chest pain     Time reflects when diagnosis was documented in both MDM as applicable and the Disposition within this note     Time User Action Codes Description Comment    12/27/2018 12:39 PM Kavita Rodriguez Add [F41 8] Depression with anxiety     12/27/2018 12:39 PM Jerlynn Kilts [F41 8] Depression with anxiety     12/27/2018 12:39 PM Jereld Kilts [F41 8] Depression with anxiety     12/27/2018 12:39 PM Kavita Rodriguez Add [G31 83,  F02 80] Lewy body dementia without behavioral disturbance     12/27/2018 12:39 PM Hoodeld Kilts [G31 83,  F02 80] Lewy body dementia without behavioral disturbance     12/27/2018  2:20 PM Vinay Gomez Add [G13 715] History of chest pain       ED Disposition     ED Disposition Condition Comment    Discharge  Cam Milch  discharge to home/self care      Condition at discharge: Stable        Follow-up Information     Follow up With Specialties Details Why Contact Info    Gualberto Barger MD Family Medicine  for reevaluation  [address] [facility]  [phone]            Discharge Medication List as of 12/27/2018  2:20 PM      CONTINUE these medications which have NOT CHANGED    Details   ALPRAZolam (XANAX) 0 25 mg tablet 1 pill bid prn anxiety, Historical Med      beclomethasone (QVAR REDIHALER) 80 MCG/ACT inhaler Inhale 2 puffs 2 (two) times a day Rinse mouth after use , Starting Wed 8/8/2018, Normal      buPROPion (WELLBUTRIN XL) 150 mg 24 hr tablet Take 1 tablet by mouth daily, Historical Med      carbidopa-levodopa (SINEMET)  mg per tablet TK 1 T PO QID, Historical Med      Coenzyme Q10 (UBIDECARENONE) POWD Take 1 tablet by mouth, Historical Med      cycloSPORINE (RESTASIS) 0 05 % ophthalmic emulsion Apply 1 drop to eye every 12 (twelve) hours, Starting Tue 9/29/2015, Historical Med      dextromethorphan-quinidine (NUEDEXTA) 20-10 mg Take 1 capsule by mouth daily, Starting Wed 4/5/2017, Historical Med      donepezil (ARICEPT) 10 mg tablet daily at bedtime  , Starting Mon 1/29/2018, Historical Med      DYMISTA 137-50 MCG/ACT SUSP INSTIL 1 SPRAY IN EACH NOSTRIL TWICE A DAY AS NEEDED, Historical Med      finasteride (PROSCAR) 5 mg tablet Take 1 tablet (5 mg total) by mouth daily, Starting Tue 7/31/2018, Normal      Multiple Vitamins-Minerals (CENTRUM SILVER 50+MEN PO) Take 1 tablet by mouth daily, Historical Med      saccharomyces boulardii (FLORASTOR) 250 mg capsule Take 1 tablet by mouth, Historical Med      tamsulosin (FLOMAX) 0 4 mg Take 1 capsule (0 4 mg total) by mouth daily, Starting Mon 5/14/2018, Normal      albuterol (PROVENTIL HFA,VENTOLIN HFA) 90 mcg/act inhaler Inhale 1 puff every 6 (six) hours, Historical Med      ammonium lactate (LAC-HYDRIN) 12 % cream Starting Wed 1/10/2018, Historical Med      ibuprofen (MOTRIN) 600 mg tablet Take 1 tablet (600 mg total) by mouth every 8 (eight) hours as needed for mild pain for up to 10 days, Starting Fri 8/3/2018, Until Wed 10/31/2018, Normal      Mirabegron ER (MYRBETRIQ) 50 MG TB24 Take 1 tablet (50 mg total) by mouth daily, Starting Thu 7/5/2018, Normal      triamcinolone (KENALOG) 0 1 % lotion Apply topically 3 (three) times a day, Starting Mon 8/22/2016, Historical Med           No discharge procedures on file  ED Provider  Attending physically available and evaluated Augustine Hutchison I managed the patient along with the ED Attending      Electronically Signed by         Shalonda Trinidad DO  12/29/18 9133

## 2018-12-27 NOTE — ED ATTENDING ATTESTATION
Chayito Adams DO, saw and evaluated the patient  I have discussed the patient with the resident/non-physician practitioner and agree with the resident's/non-physician practitioner's findings, Plan of Care, and MDM as documented in the resident's/non-physician practitioner's note, except where noted  All available labs and Radiology studies were reviewed  At this point I agree with the current assessment done in the Emergency Department  I have conducted an independent evaluation of this patient including a focused history and a physical exam     ED Note - Flaco Diaz  69 y o  male MRN: [id number]  Unit/Bed#: ED 06 Encounter: [id number]    History of Present Illness   HPI  Flaco Diaz  is a 69 y o  male who presents for evaluation of transient centrally located nonradiating chest pressure associated with dyspnea  The symptoms onset around 6 o'clock this morning and have completely resolved  At this time the patient is asymptomatic and voices no complaints  Patient does have a history of Lewy body dementia and does have advanced dementia at this time  Patient is accompanied by his wife who is his decision maker  REVIEW OF SYSTEMS  See HPI for further details  12 systems reviewed and otherwise negative except as noted     Historical Information     PAST MEDICAL HISTORY  Past Medical History:   Diagnosis Date    Asthma     Depression with anxiety     last assessed 12/5/14    [facility])        FAMILY HISTORY  Family History   Problem Relation Age of Onset    Coronary artery disease Father     Stroke Father        SOCIAL HISTORY  Social History     Social History    Marital status: /Civil Union     Spouse name: N/A    Number of children: N/A    Years of education: N/A     Social History Main Topics    Smoking status: Never Smoker    Smokeless tobacco: Never Used    Alcohol use Yes      Comment: social    Drug use: No    Sexual activity: Not Asked     Other Topics Concern  None     Social History Narrative    None       SURGICAL HISTORY  Past Surgical History:   Procedure Laterality Date    CATARACT EXTRACTION  2003    COLONOSCOPY  2002    complete    HERNIA REPAIR  1998    PROSTATE BIOPSY      needle bx     Meds/Allergies     CURRENT MEDICATIONS  No current facility-administered medications for this encounter       Current Outpatient Prescriptions:     ALPRAZolam (XANAX) 0 25 mg tablet, 1 pill bid prn anxiety, Disp: , Rfl:     beclomethasone (QVAR REDIHALER) 80 MCG/ACT inhaler, Inhale 2 puffs 2 (two) times a day Rinse mouth after use , Disp: 10 6 g, Rfl: 5    buPROPion (WELLBUTRIN XL) 150 mg 24 hr tablet, Take 1 tablet by mouth daily, Disp: , Rfl:     carbidopa-levodopa (SINEMET)  mg per tablet, TK 1 T PO QID, Disp: , Rfl: 0    Coenzyme Q10 (UBIDECARENONE) POWD, Take 1 tablet by mouth, Disp: , Rfl:     cycloSPORINE (RESTASIS) 0 05 % ophthalmic emulsion, Apply 1 drop to eye every 12 (twelve) hours, Disp: , Rfl:     dextromethorphan-quinidine (NUEDEXTA) 20-10 mg, Take 1 capsule by mouth daily, Disp: , Rfl:     donepezil (ARICEPT) 10 mg tablet, daily at bedtime  , Disp: , Rfl: 3    DYMISTA 137-50 MCG/ACT SUSP, INSTIL 1 SPRAY IN EACH NOSTRIL TWICE A DAY AS NEEDED, Disp: , Rfl: 5    finasteride (PROSCAR) 5 mg tablet, Take 1 tablet (5 mg total) by mouth daily, Disp: 90 tablet, Rfl: 3    Multiple Vitamins-Minerals (CENTRUM SILVER 50+MEN PO), Take 1 tablet by mouth daily, Disp: , Rfl:     saccharomyces boulardii (FLORASTOR) 250 mg capsule, Take 1 tablet by mouth, Disp: , Rfl:     tamsulosin (FLOMAX) 0 4 mg, Take 1 capsule (0 4 mg total) by mouth daily, Disp: 90 capsule, Rfl: 3    albuterol (PROVENTIL HFA,VENTOLIN HFA) 90 mcg/act inhaler, Inhale 1 puff every 6 (six) hours, Disp: , Rfl:     ammonium lactate (LAC-HYDRIN) 12 % cream, , Disp: , Rfl: 1    ibuprofen (MOTRIN) 600 mg tablet, Take 1 tablet (600 mg total) by mouth every 8 (eight) hours as needed for mild pain for up to 10 days, Disp: 30 tablet, Rfl: 3    Mirabegron ER (MYRBETRIQ) 50 MG TB24, Take 1 tablet (50 mg total) by mouth daily (Patient not taking: Reported on 12/27/2018 ), Disp: 30 tablet, Rfl: 11    triamcinolone (KENALOG) 0 1 % lotion, Apply topically 3 (three) times a day, Disp: , Rfl:     (Not in a hospital admission)    ALLERGIES  No Known Allergies  Objective     PHYSICAL EXAM    VITAL SIGNS: Blood pressure (!) 184/87, pulse 80, temperature (!) 97 3 °F (36 3 °C), temperature source Rectal, resp  rate 20, weight 77 1 kg (170 lb), SpO2 95 %  Constitutional:  Appears well developed and well nourished, no acute distress, non-toxic appearance   Eyes:  PERRL, EOMI, conjunctivae pink, sclerae non-icteric    HENT:  Normocephalic/Atraumatic, no rhinorrhea, mucous membranes moist   Neck: normal range of motion, no tenderness, supple   Respiratory:  No respiratory distress, normal breath sounds, no accessory muscle use, no intercostal retractions, no crackles, no rhonchi, no wheezing, no stridor   Cardiovascular:  Normal rate, normal rhythm, no murmurs, no gallops, no rubs, peripheral pulses intact, no carotid bruits, no JVD  GI:  Soft, non-tender, non-distended, normal bowel sounds, no organomegaly, no mass, no rebound, no guarding   :  No CVAT, no flank ecchymosis   Musculoskeletal:  No swelling or edema, no tenderness, no deformities  Integument:  Pink, warm, dry, Well hydrated, no rash, no erythema, no bullae   Lymphatic:  No cervical/ tonsillar/ submandibular lymphadenopathy noted   Neurologic:  Awake, Alert & oriented x 1, CN 2-12 intact, no focal neurological deficits  Psychiatric:  Speech and behavior appropriate       ED COURSE and MDM:    Assessment/Plan   Assessment:  Stef Kwon  is a 69 y o  male presents for evaluation of chest pain and dyspnea  Complete resolved in asymptomatic at this time  HEART score: 4    Plan:  Labs, EKG, CXR, Symptom management   Disposition as appropriate  CRITICAL CARE TIME: 0 minutes      Portions of the record may have been created with voice recognition software  Occasional wrong word or "sound a like" substitutions may have occurred due to the inherent limitations of voice recognition software       ED Provider  Electronically Signed by

## 2018-12-27 NOTE — SOCIAL WORK
CM consulted by palliative care to provide SNF list to family  CM printed out list from [place] and provided it to family  No other needs reported at this time

## 2018-12-27 NOTE — CONSULTS
Consultation - Palliative Care   [facility]  69 y o  male MRN: [id number]  Unit/Bed#: ED 06 Encounter: [id number]      Assessment/Plan     Assessment:  Patient Active Problem List   Diagnosis    Allergic rhinitis    BPH with obstruction/lower urinary tract symptoms    Depression with anxiety    Excessive crying, adult    Hypertension    Insomnia    Lewy body dementia without behavioral disturbance    Memory loss    Mixed hyperlipidemia    Overactive bladder    Parkinson's disease (HCC)    SOB (shortness of breath)    Mild persistent asthma without complication    Unsteady gait    Chronic bilateral low back pain without sciatica       Plan:  · Goals of care/support  · Level 3 DNR  · Patient incapable of making medical decisions secondary to AMS related to LBD and PD  · Wife, Arabella Morris, at bedside functions as healthcare agent  · Primary goal is to maintain comfort in the home setting and avoid further hospitalizations unless necessary to achieve comfort  POLST completed to reflect this  · Patient not yet a candidate for hospice, but will enroll on the PALS at home program  · Will also ask palliative care RN and SW teams to look into additional resources such as waiver services, additional private caregiving, and status on hospital bed which was ordered by PCP  · SNF list provided to Arabella Morris at her request but she would like to trial increased home care first   · Symptom management  · Anxiety: Continue xanax 0 25mg BID PRN as prescribed by PCP (used several times/week per wife and caregiver) causes drowsiness but effective during episodes of anxiety  · Hallucinations: not bothersome to patient, no additional agents at this time  · Agitation/sundowning: Seroquel QAM was previously tried but was not efficacious per wife  Consider HS use of xanax to assist in sleep  · Chest pain/dyspnea: could consider low dose roxanol solution if this occurs in the future   May also treat with xanax as discussed with wife, Glendy Modi  Per wife's history may be related to anxiety  Exacerbated by environment change/hospitalization  Albuterol PRN wheezing, could consider levalbuterol if patient with persistent anxiety following use  · Clear that wife's goal is to optimize comfort/symptom management  She does not desire any further treatment escalation in his care  Palliative care will continue to follow on an outpatient basis  Please call with questions or concerns [phone]  History of Present Illness   Physician Requesting Consult: Shamika Scott DO  Reason for Consult / Principal Problem: goals of care  Hx and PE limited by: patient's AMS, received via report of wife  HPI: Christian Carrington  is a 69y o  year old male , PMH parkinsons disease, lewy body dementia, anxiety, depression, and asthma presented to [facility] ED with chest pain and dyspnea with acute onset while at home  Patient follows with [facility] neurology for parkinsons disease and lewy body dementia  He has been on sinemet 25-100mg QID for "some time" per wife  Patient was diagnosed about 5 years ago but has had progressive functional decline in the past several months  Patient walks by pushing a wheelchair around the home  He is able to wash his face and brush his teeth but otherwise is completely dependent on his wife, Glendy Modi, and personal caregiver who is present about 4-5 hours a day 5 days a week  Glendy Modi states she is becoming overwhelmed wit his care and has requested more help at home  She is hopeful palliative care can assist in finding her increased home support otherwise is thinking about SNF placement  Patient is extremely [place] and confused and therefor history obtained from wife  She describes him complaining of acute chest pain and dyspnea prior to admission  When he got to the ED anxiety was provoked  Since the decision has been made to focus most on comfort he has become more calm  Patient denies any complaints at current      Glendy Modi explains that he is an anxious person for which he requires xanax 0 25mg several times a week  She tries not to use it with concern that it makes him too drowsy to ambulate or eat  She admits that he experiences sundowning at night where he becomes confused and has hallucinations but is rarely disturbed by the hallucinations  She states that his appetite isn't "great" but that he does not refuse meals and he has not lost any weight  Patient does not have any difficulty swallowing  A nurse practitioner from [facility] previously trialed patient on seroquel QAM but Lauralaura Naresh states that this was not helpful for him as it made him more "confused and tired"  Inpatient consult to Palliative Care  Consult performed by: Alphonso Cos  Consult ordered by: Zulema Galicia          Review of Systems   Unable to perform ROS: Mental status change       Historical Information   Past Medical History:   Diagnosis Date    Asthma     Depression with anxiety     last assessed 12/5/14    [facility])      Past Surgical History:   Procedure Laterality Date    CATARACT EXTRACTION  2003    COLONOSCOPY  2002    complete    HERNIA REPAIR  1998    PROSTATE BIOPSY      needle bx     Social History     Social History    Marital status: /Civil Union     Spouse name: N/A    Number of children: N/A    Years of education: N/A     Social History Main Topics    Smoking status: Never Smoker    Smokeless tobacco: Never Used    Alcohol use Yes      Comment: social    Drug use: No    Sexual activity: Not Asked     Other Topics Concern    None     Social History Narrative    None     Family History   Problem Relation Age of Onset    Coronary artery disease Father     Stroke Father        Meds/Allergies   all current active meds have been reviewed and current meds:   No current facility-administered medications for this encounter        No Known Allergies    Objective     Physical Exam   Constitutional: He appears well-developed  HENT:   Head: Normocephalic and atraumatic  Eyes: Conjunctivae are normal    Cardiovascular:   Intermittent tachycardia   Pulmonary/Chest: Effort normal  No respiratory distress  Abdominal: Soft  Bowel sounds are normal  He exhibits no distension  There is no tenderness  Musculoskeletal: He exhibits no edema  Neurological: He is alert  Confused  Oriented to himself and his wife  Skin: Skin is warm and dry  Psychiatric:   Confused, hard of hearing  Does not appear to be anxious or agitated during time of exam        Lab Results:   I have personally reviewed pertinent labs  , CBC:   Lab Results   Component Value Date    WBC 8 95 12/27/2018    HGB 15 7 12/27/2018    HCT 47 9 12/27/2018     (H) 12/27/2018     12/27/2018    MCH 33 6 12/27/2018    MCHC 32 8 12/27/2018    RDW 12 6 12/27/2018    MPV 10 4 12/27/2018    NRBC 0 12/27/2018   , CMP:   Lab Results   Component Value Date    SODIUM 137 12/27/2018    K 3 7 12/27/2018     12/27/2018    CO2 31 12/27/2018    BUN 16 12/27/2018    CREATININE 1 03 12/27/2018    CALCIUM 8 9 12/27/2018    AST 20 12/27/2018    ALT 12 12/27/2018    ALKPHOS 76 12/27/2018    EGFR 70 12/27/2018     Imaging Studies: I have personally reviewed pertinent reports  EKG, Pathology, and Other Studies: I have personally reviewed pertinent reports  Code Status: Level 3 - DNAR and DNI  Advance Directive and Living Will:      Power of :    POLST:      Counseling / Coordination of Care  Total floor / unit time spent today 60+ minutes  Greater than 50% of total time was spent with the patient and / or family counseling and / or coordination of care  A description of the counseling / coordination of care: time spent with patient, wife, and personal caregiver  Discussion with primary team and referral to home PALS  Also discussed case with patient's nurse, Amol Heads, and CM

## 2018-12-27 NOTE — ED RE-EVALUATION NOTE
Discussed disposition with the patient's wife and her close friend  Patient's wife states that they have been awaiting an outpatient palliative care appointment to discuss goals of care and possible hospice care  Review of patient's advanced directive shows that he does not want any type of heroic measure to include CPR and mechanical ventilation  Patient's wife provided with a POLST form and palliative care consulted for further evaluation  Will refrain from admission at this time to benefit the patient due to advanced dementia and concern for exacerbation of sundowning while in a strange environment  Patient's wife states the patient is already becoming anxious at the thought of being admitted to the hospital  The patient's wife fully understands this and is in agreement to minimize any admissions to the hospital   Discussed code status with the patient's wife and based on the patient's advance directive, he is a level 3 DNR  Photos of advanced directive and POLST form in MEDIA section               Zaki 8, DO  12/27/18 [address] [place] [address], DO  12/27/18 [id number]

## 2019-01-02 ENCOUNTER — PATIENT OUTREACH (OUTPATIENT)
Dept: FAMILY MEDICINE CLINIC | Facility: CLINIC | Age: 79
End: 2019-01-02

## 2019-01-02 NOTE — PROGRESS NOTES
Spoke with wife  She is waiting to hear from Palliative about followup home visit she states her  saw the  in the hospital when he was in the emergency room  Has not heard anything about hospital bed I will check on that

## 2019-01-05 DIAGNOSIS — J30.9 ALLERGIC RHINITIS, UNSPECIFIED SEASONALITY, UNSPECIFIED TRIGGER: Primary | ICD-10-CM

## 2019-01-05 RX ORDER — AZELASTINE HYDROCHLORIDE AND FLUTICASONE PROPIONATE 137; 50 UG/1; UG/1
1 SPRAY, METERED NASAL 2 TIMES DAILY
Qty: 1 BOTTLE | Refills: 3 | Status: SHIPPED | OUTPATIENT
Start: 2019-01-05 | End: 2019-02-22

## 2019-01-05 NOTE — TELEPHONE ENCOUNTER
Needs refill on [address]  Nasal spray    States called earlier this week and told needed refill      Please send to Jim hutchinson

## 2019-01-05 NOTE — TELEPHONE ENCOUNTER
Dr Sherice Villaseñor, would you please process this request? Patient is out and would really appreciate this filled as soon as possible

## 2019-01-09 ENCOUNTER — IN HOME VISIT (OUTPATIENT)
Dept: PALLIATIVE MEDICINE | Facility: CLINIC | Age: 79
End: 2019-01-09
Payer: COMMERCIAL

## 2019-01-09 ENCOUNTER — PATIENT OUTREACH (OUTPATIENT)
Dept: FAMILY MEDICINE CLINIC | Facility: CLINIC | Age: 79
End: 2019-01-09

## 2019-01-09 VITALS
TEMPERATURE: 97.2 F | OXYGEN SATURATION: 94 % | HEART RATE: 67 BPM | SYSTOLIC BLOOD PRESSURE: 130 MMHG | DIASTOLIC BLOOD PRESSURE: 70 MMHG

## 2019-01-09 DIAGNOSIS — G20 PARKINSON DISEASE (HCC): ICD-10-CM

## 2019-01-09 DIAGNOSIS — G31.83 DEMENTIA WITH LEWY BODIES (CODE) (HCC): Primary | ICD-10-CM

## 2019-01-09 PROCEDURE — 99490 CHRNC CARE MGMT STAFF 1ST 20: CPT

## 2019-01-09 NOTE — PROGRESS NOTES
Assessment/Plan:      There are no diagnoses linked to this encounter  Subjective:     Patient ID: Francisco Dawson  is a 69 y o  male  Initial visit made to patients home as part of the Palliative Care at Home program through [facility]  Greater than 1 hour was spent in direct patient care and participating in goals of care conversation  Chronic Care Management facilitated by this visit  Emotional and social issues addressed  Present during the visit were the patient and spouse and caregiver  Spouse and caregiver report increase in weakness with decrease in safe mobilty  Reports 3 falls in the home in past week  No hospitalization required  Bandage to left forearm noted  Per caregiver has skin tear  Did not observe wound  Per spouse and caregiver pt is unable to follow simple commands as before  He begins to walk and then is unable to move his legs needing much more prompting  Did assist pt to walk during assessment  Needed 2 people with use of gait belt to offer more stability  Cannot use walker as pt not able to cognitively able to understand use  Patient needs per spouse are increasing  Discussed safety issues and interventions to help decrease fall episodes  Per spouse PCP has been working on getting hospital bed  This nurse will follow up with PCP  on status of hospital bed  Discussed use of video monitoring system in home to help prevent falling with early intervention prior to pt trying to walk alone  Pt able to answer some questions as to his level of pain but did not fully understand why the nurses were in his home  Instructed on patient ,spouse and caregiver role of palliative care in the home  Symptom management, social and emotional support  At end of visit spouse spoke with nurse not in presence of patient  States is actively looking at  Placement for pt  She states has mentioned to her   Unsure how much pt understands     Plan to followup with nurse or CRNP in 1 month         Review of Systems   Constitutional: Positive for appetite change and fatigue  Per caregivers patient able to chew and swallow food but does not always know how to take smaller bites of food  Respiratory: Positive for shortness of breath  No sob at rest  Noted mild sob with ambulation    Cardiovascular: Negative  Gastrointestinal: Negative  Musculoskeletal: Positive for gait problem  Unable to walk without assist of 1 to 2 and use of gait belt related to Lewy Body parkinsons  Fall risk    Neurological: Positive for dizziness and weakness  When first standing   Psychiatric/Behavioral: Positive for confusion  Abram Body dementia   forgetfulness          Objective:     Physical Exam   Constitutional: He appears well-developed and well-nourished  Cardiovascular: Normal rate and regular rhythm  Pulmonary/Chest: Effort normal and breath sounds normal    Abdominal: Soft  Musculoskeletal:     Lewy Body dementia  parkinsons    Rom [place] Limited mobilty related to same   Neurological: Coordination abnormal    diminshed problem solving skills related to Lewy body dementia  Skin: Skin is warm and dry  Psychiatric:   Alert to person  Needs many cues to stand and walk  Forgetful/confusion memory deficits related to Lewy body dementia    Plan visit with RN or CRNP in 1 month

## 2019-01-09 NOTE — PROGRESS NOTES
Spoke with wife Chantell Burch to let her know that insurance needs to get back to [facility] for hospital bed

## 2019-01-15 ENCOUNTER — DOCUMENTATION (OUTPATIENT)
Dept: PALLIATIVE MEDICINE | Facility: HOSPITAL | Age: 79
End: 2019-01-15

## 2019-01-15 ENCOUNTER — TELEPHONE (OUTPATIENT)
Dept: PALLIATIVE MEDICINE | Facility: CLINIC | Age: 79
End: 2019-01-15

## 2019-01-15 NOTE — TELEPHONE ENCOUNTER
Patient's wife shared she is looking to place client in a [facility] facility  She is having some difficulty with this and would like some assistance  Can you reach out to client to provide her with some direction  She has visited a couple of facilities in her area already  I plan to go back in a few weeks to see patient if he remains in his home   Renetta Hi

## 2019-01-15 NOTE — PROGRESS NOTES
SOSAW spoke to patients spouse Araceli Kurtz over the phone regarding Memory Care support needs for patient  Araceli Kurtz would appreciate more information on options for Memory Care units versus nursing facilities that have memory care supports  LCSW assured her she would look into more and get back to her with options  SANJUANITA then called [facility] who assisted with locating Memory Care Units  They will be sending me information to send gather for Araceli Kurtz  SANJUANITA will plan to follow up once she gathers more information for Araceli Kurtz and options

## 2019-01-21 ENCOUNTER — TELEPHONE (OUTPATIENT)
Dept: PALLIATIVE MEDICINE | Facility: HOSPITAL | Age: 79
End: 2019-01-21

## 2019-01-21 NOTE — TELEPHONE ENCOUNTER
LCSW spoke to patients spouse Alexander Farrar over the phone to follow up about the Memory Care Units  LCSW was able to gather information and provided this to her  It appears patient would be capable at the Memory Care level as each facility has a level system of support needs  The facilities spoke of were [facility], [facility] the [place] [address] [facility] [address]  LCSW encouraged Alexander Farrar to reach out if any other questions or needs, or felt like looking into the nursing home aspect instead of Memory Care

## 2019-01-28 ENCOUNTER — TELEPHONE (OUTPATIENT)
Dept: FAMILY MEDICINE CLINIC | Facility: CLINIC | Age: 79
End: 2019-01-28

## 2019-01-28 NOTE — TELEPHONE ENCOUNTER
Patient's wife called  Said she [facility] and paperwork to be signed by Dr Lisy Kemp but she can't get him here or has help  Would like a phone call  Can be reached at [phone]  Please advise

## 2019-01-28 NOTE — TELEPHONE ENCOUNTER
Spoke with Merle Langley states she would have trouble bringing patient in, informed her to just drop off forms for Dr Karyna Deluna to complete and if Dr Karyna Deluna would need to see patient will advise, Merle Langley agreed to drop off forms today

## 2019-01-30 ENCOUNTER — TELEPHONE (OUTPATIENT)
Dept: FAMILY MEDICINE CLINIC | Facility: CLINIC | Age: 79
End: 2019-01-30

## 2019-01-30 ENCOUNTER — IN HOME VISIT (OUTPATIENT)
Dept: PALLIATIVE MEDICINE | Facility: CLINIC | Age: 79
End: 2019-01-30
Payer: COMMERCIAL

## 2019-01-30 VITALS — RESPIRATION RATE: 16 BRPM | SYSTOLIC BLOOD PRESSURE: 138 MMHG | HEART RATE: 72 BPM | DIASTOLIC BLOOD PRESSURE: 68 MMHG

## 2019-01-30 DIAGNOSIS — G31.83 DEMENTIA WITH LEWY BODIES (CODE) (HCC): Primary | ICD-10-CM

## 2019-01-30 DIAGNOSIS — G20 PARKINSON DISEASE (HCC): ICD-10-CM

## 2019-01-30 PROCEDURE — 99490 CHRNC CARE MGMT STAFF 1ST 20: CPT

## 2019-01-30 NOTE — PROGRESS NOTES
Assessment/Plan:      There are no diagnoses linked to this encounter  Subjective:     Patient ID: Torres Chavarria  is a 69 y o  male  Visit made to patients home as part of the Palliative Care at Home program through [facility]  Greater than 1/2 hour was spent in direct patient care and participating in goals of care conversation  Chronic Care Management facilitated by this visit  Emotional and social issues addressed  This nurse reviewed all medications and instructed on their use and dosing  Used the teach back method to faciilitate learning of proper medication administration  Present during the visit were the patient, wife and family friend  Goals of care conversation included plans for patient to be moving into a memory care facility [facility] within the next few weeks  They would like patient to benefit from memory tools and task-oriented activities throughout the day  They will not need Palliative Care services after he moves to this facility  He has had a recent decline and they worry he could have an infection somewhere  Deny urine odor or color changes, lungs clear  They will check with their PCP to see if he needs blood work or urine sample done  Many questions surrounding patients neudexa medication for uncontrollable crying and his need for prior authorization for insurance  Explained how the insurance prior authorization process works and that they should also call their insurance to advocate patient gets it approved quickly as he used up last pill yesterday  PCP office is working on prior auth per the wife as well  She will call insurance today  Patient slow to follow commands, pleasant affect, no crying during this nurse visit  Reports he was anxious prior to the visit because he worries when new people come  Wife plans to call us when patient admitted to memory care  No further visits planned at this time           Review of Systems Constitutional: Positive for activity change and fatigue  HENT: Negative  Eyes: Negative  Respiratory: Negative  Cardiovascular: Negative  Gastrointestinal: Negative  Endocrine: Negative  Genitourinary: Negative  Musculoskeletal: Positive for gait problem  Skin: Negative  Allergic/Immunologic: Negative  Neurological: Positive for weakness  Hematological: Negative  Psychiatric/Behavioral: Positive for confusion  The patient is nervous/anxious  Objective:     Physical Exam   Constitutional: He appears well-nourished  Eyes: Pupils are equal, round, and reactive to light  Neck: Neck supple  Cardiovascular: Normal rate, regular rhythm, normal heart sounds and intact distal pulses  Pulmonary/Chest: Effort normal and breath sounds normal    Abdominal: Bowel sounds are normal    Neurological: He is alert  Skin: Skin is warm and dry  Psychiatric: He has a normal mood and affect   His behavior is normal

## 2019-01-30 NOTE — TELEPHONE ENCOUNTER
Flex from [facility] [employer] has some questions about patient  He can be reached at [phone]  Please advise

## 2019-01-30 NOTE — TELEPHONE ENCOUNTER
Nadege Stephens spoke with staff Adra Friend unavailable provided cell # [phone]  Called Adra Friend unable to reach due to call restrictions

## 2019-02-01 ENCOUNTER — TELEPHONE (OUTPATIENT)
Dept: FAMILY MEDICINE CLINIC | Facility: CLINIC | Age: 79
End: 2019-02-01

## 2019-02-01 NOTE — TELEPHONE ENCOUNTER
Juana Leyva (P: [phone]; F: [phone]) phoned to state that patient will be admitted on 2/6/2019  They received the faxed paperwork from our office, however, the patient has to be evaluated within 30-60 days prior to admission  Patient was seen in the ED 12/27/18, Donavon Jaramillo wasn't sure if you want to use the ED information as it is difficult for the family to get patient to doctor appointments

## 2019-02-04 NOTE — TELEPHONE ENCOUNTER
[facility] did  ([phone]) receive faxed notes on 19  However, date on paperwork has  for Kosta to be approved as a Resident within [facility] [employer]  [facility] refaxing the form and requesting if paperwork could have the ER evaluation date to ensure Kosta's admission on 19  ( only allowed a 60 day window from the beginning to end of admission process )     Questions call [facility] Dan # above

## 2019-02-05 ENCOUNTER — IN HOME VISIT (OUTPATIENT)
Dept: PALLIATIVE MEDICINE | Facility: CLINIC | Age: 79
End: 2019-02-05
Payer: COMMERCIAL

## 2019-02-05 DIAGNOSIS — G31.83 LEWY BODY DEMENTIA WITH BEHAVIORAL DISTURBANCE (HCC): Primary | ICD-10-CM

## 2019-02-05 DIAGNOSIS — G20 ATYPICAL PARKINSONISM (HCC): ICD-10-CM

## 2019-02-05 DIAGNOSIS — R41.3 MEMORY LOSS: ICD-10-CM

## 2019-02-05 DIAGNOSIS — G20 PARKINSON'S DISEASE (HCC): ICD-10-CM

## 2019-02-05 DIAGNOSIS — F02.81 LEWY BODY DEMENTIA WITH BEHAVIORAL DISTURBANCE (HCC): Primary | ICD-10-CM

## 2019-02-05 PROCEDURE — 99348 HOME/RES VST EST LOW MDM 30: CPT | Performed by: NURSE PRACTITIONER

## 2019-02-06 NOTE — TELEPHONE ENCOUNTER
I called Dori Gray from [facility] [employer]  She states a nurse practitioner visited pt and filled out the new form, so they do not need it any more  Pt may be admitted tomorrow

## 2019-02-06 NOTE — TELEPHONE ENCOUNTER
Received [facility] forms to be completed by his doctor on 2-6-19   Gave to Dr Sonido Caceres MA

## 2019-02-08 VITALS
RESPIRATION RATE: 16 BRPM | DIASTOLIC BLOOD PRESSURE: 78 MMHG | HEART RATE: 72 BPM | SYSTOLIC BLOOD PRESSURE: 146 MMHG | OXYGEN SATURATION: 94 %

## 2019-02-08 NOTE — PROGRESS NOTES
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[facility] )        Plan of care:   - paperwork completed for admission to dementia unit and faxed  - patient will no longer receive palliative care services once at dementia unit    Symptom management:   - continue with current medications including PRN alprazolam, bupropion, Nuedexta        Subjective    Chief Concern  Follow up visit for:  PALS at home         History of Present Illness  Patient ID: Cheo Peterson  is a 69 y o  male With history of Lewy body dementia Parkinson's disease, atypical parkinsonism  Patient lives at home with his wife  He has a caregiver during the day  Is planning to move into [facility]  Patient moves around home with standby assistance  He is forgetful  He occasionally has hallucinations  Previously labile emotions  These have improved with Nuedexta  Family reports sundowning and worsened behaviors in late afternoon and evening  Eats regular diet  Appetite stable  Recent ER visit 12/27 for chest pain and dyspnea  No acute findings  Possibly related to anxiety  The following portions of the patient's history were reviewed and updated as appropriate: allergies, current medications, past family history, past medical history, past social history, past surgical history and problem list       Visit Information    Accompanied By: Spouse, aide  Source of History: Spouse, aide  History Limitations: Clinical Condition - dementia    ROS  Review of Systems   Unable to perform ROS: Dementia           Objective     Physical Exam   Constitutional: He is cooperative  Chronically ill   Cardiovascular: Normal rate and regular rhythm  Trace lower extremity edema   Pulmonary/Chest: Effort normal and breath sounds normal    Neurological: He is alert  Oriented to self, baseline confusion and forgetfulness, able to answer simple questions, disoriented to situation   Psychiatric: His mood appears anxious  Cognition and memory are impaired           /78   Pulse 72   Resp 16   SpO2 94%       - PPS: 50      Current Outpatient Prescriptions:     ALPRAZolam (XANAX) 0 25 mg tablet, 1 pill bid prn anxiety, Disp: , Rfl:     ammonium lactate (LAC-HYDRIN) 12 % cream, , Disp: , Rfl: 1    beclomethasone (QVAR REDIHALER) 80 MCG/ACT inhaler, Inhale 2 puffs 2 (two) times a day Rinse mouth after use , Disp: 10 6 g, Rfl: 5    buPROPion (WELLBUTRIN XL) 150 mg 24 hr tablet, Take 1 tablet by mouth daily, Disp: , Rfl:     carbidopa-levodopa (SINEMET)  mg per tablet, TK 1 T PO QID, Disp: , Rfl: 0    Coenzyme Q10 (UBIDECARENONE) POWD, Take 1 tablet by mouth, Disp: , Rfl:     cycloSPORINE (RESTASIS) 0 05 % ophthalmic emulsion, Apply 1 drop to eye every 12 (twelve) hours, Disp: , Rfl:     dextromethorphan-quinidine (NUEDEXTA) 20-10 mg, Take 1 capsule by mouth daily, Disp: , Rfl:     donepezil (ARICEPT) 10 mg tablet, daily at bedtime  , Disp: , Rfl: 3    DYMISTA 137-50 MCG/ACT SUSP, 1 spray into each nostril 2 (two) times a day for 30 days, Disp: 1 Bottle, Rfl: 3    finasteride (PROSCAR) 5 mg tablet, Take 1 tablet (5 mg total) by mouth daily, Disp: 90 tablet, Rfl: 3    ibuprofen (MOTRIN) 600 mg tablet, Take 1 tablet (600 mg total) by mouth every 8 (eight) hours as needed for mild pain for up to 10 days, Disp: 30 tablet, Rfl: 3    Multiple Vitamins-Minerals (CENTRUM SILVER 50+MEN PO), Take 1 tablet by mouth daily, Disp: , Rfl:     saccharomyces boulardii (FLORASTOR) 250 mg capsule, Take 1 tablet by mouth, Disp: , Rfl:     tamsulosin (FLOMAX) 0 4 mg, Take 1 capsule (0 4 mg total) by mouth daily, Disp: 90 capsule, Rfl: [address] and Supportive Care

## 2019-02-22 ENCOUNTER — HOSPITAL ENCOUNTER (EMERGENCY)
Facility: HOSPITAL | Age: 79
Discharge: HOME | End: 2019-02-22
Attending: EMERGENCY MEDICINE
Payer: COMMERCIAL

## 2019-02-22 DIAGNOSIS — S00.81XA ABRASION OF FOREHEAD, INITIAL ENCOUNTER: Primary | ICD-10-CM

## 2019-02-22 DIAGNOSIS — S09.90XA CLOSED HEAD INJURY, INITIAL ENCOUNTER: ICD-10-CM

## 2019-02-22 DIAGNOSIS — S50.812A ABRASION OF LEFT FOREARM, INITIAL ENCOUNTER: ICD-10-CM

## 2019-02-22 PROCEDURE — 99283 EMERGENCY DEPT VISIT LOW MDM: CPT

## 2019-02-22 PROCEDURE — 93005 ELECTROCARDIOGRAM TRACING: CPT

## 2019-02-22 RX ORDER — BACITRACIN, NEOMYCIN, POLYMYXIN B 400; 3.5; 5 [USP'U]/G; MG/G; [USP'U]/G
1 OINTMENT TOPICAL ONCE
Status: COMPLETED | OUTPATIENT
Start: 2019-02-22 | End: 2019-02-22

## 2019-02-22 RX ADMIN — BACITRACIN, NEOMYCIN, POLYMYXIN B 1 SMALL APPLICATION: 400; 3.5; 5 OINTMENT TOPICAL at 21:27

## 2019-02-23 LAB
ATRIAL RATE: 163 BPM
QRS AXIS: -72 DEGREES
QRSD INTERVAL: 146 MS
QT INTERVAL: 372 MS
QTC INTERVAL: 455 MS
T WAVE AXIS: 51 DEGREES
VENTRICULAR RATE: 90 BPM

## 2019-02-23 PROCEDURE — 93010 ELECTROCARDIOGRAM REPORT: CPT | Performed by: INTERNAL MEDICINE

## 2019-02-23 NOTE — ED ATTENDING ATTESTATION
I, Sulaiman Arzola DO, saw and evaluated the patient  I have discussed the patient with the resident/non-physician practitioner and agree with the resident's/non-physician practitioner's findings, Plan of Care, and MDM as documented in the resident's/non-physician practitioner's note, except where noted  All available labs and Radiology studies were reviewed  At this point I agree with the current assessment done in the Emergency Department  I have conducted an independent evaluation of this patient a history and physical is as follows:      Critical Care Time  Procedures     69 yr male slipped out of wheel chair and brushed head on sofa on the way down  No LOC, neck pain, thinners or platelet agents  Exm Abrasion to left arm without evidence of bony injury  5 x 5 cm abrasion to right farhead  Pln: discussed injury with wife  Will not do CT head  Wound care instr

## 2019-02-23 NOTE — ED PROVIDER NOTES
History  Chief Complaint   Patient presents with    Fall     Pt fell out of wheelchair and has abrasion on L side of forehead  - thinners, - LOC     68 yo M PMH of Lewy Body Dementia presenting from nursing home after falling out of his wheelchair and hitting his head on the couch, patient is not on blood thinners/ -LOC/ wife states that he is acting the same as his baseline  Patient is declining any pain at this time  Per wife patient has not been sick recently  Has abrasion to L forehead and L forearm both are hemostatic on presentation  Prior to Admission Medications   Prescriptions Last Dose Informant Patient Reported? Taking?    ALPRAZolam (XANAX) 0 25 mg tablet Unknown at Unknown time  Yes No   Si pill bid prn anxiety   Multiple Vitamins-Minerals (CENTRUM SILVER 50+MEN PO)  Spouse/Significant Other Yes No   Sig: Take 1 tablet by mouth daily   buPROPion (WELLBUTRIN XL) 150 mg 24 hr tablet Unknown at Unknown time Spouse/Significant Other Yes No   Sig: Take 1 tablet by mouth daily   carbidopa-levodopa (SINEMET)  mg per tablet Unknown at Unknown time Spouse/Significant Other Yes No   Sig: TK 1 T PO QID   cycloSPORINE (RESTASIS) 0 05 % ophthalmic emulsion Unknown at Unknown time Spouse/Significant Other Yes No   Sig: Apply 1 drop to eye every 12 (twelve) hours   dextromethorphan-quinidine (NUEDEXTA) 20-10 mg  Spouse/Significant Other Yes No   Sig: Take 1 capsule by mouth daily   donepezil (ARICEPT) 10 mg tablet  Spouse/Significant Other Yes No   Sig: daily at bedtime     finasteride (PROSCAR) 5 mg tablet Unknown at Unknown time Spouse/Significant Other No No   Sig: Take 1 tablet (5 mg total) by mouth daily   saccharomyces boulardii (FLORASTOR) 250 mg capsule  Spouse/Significant Other Yes No   Sig: Take 1 tablet by mouth   tamsulosin (FLOMAX) 0 4 mg Unknown at Unknown time Spouse/Significant Other No No   Sig: Take 1 capsule (0 4 mg total) by mouth daily   Patient not taking: Reported on 2/22/2019      Facility-Administered Medications: None       Past Medical History:   Diagnosis Date    Asthma     Depression with anxiety     last assessed 12/5/14    Lewy body dementia     [facility])        Past Surgical History:   Procedure Laterality Date    CATARACT EXTRACTION  2003    COLONOSCOPY  2002    complete    HERNIA REPAIR  1998    PROSTATE BIOPSY      needle bx       Family History   Problem Relation Age of Onset    Coronary artery disease Father     Stroke Father      I have reviewed and agree with the history as documented  Social History     Tobacco Use    Smoking status: Never Smoker    Smokeless tobacco: Never Used   Substance Use Topics    Alcohol use: Not Currently    Drug use: No        Review of Systems   Unable to perform ROS: Dementia       Physical Exam  ED Triage Vitals   Temp Pulse Resp BP SpO2   -- -- -- -- --      Temp src Heart Rate Source Patient Position - Orthostatic VS BP Location FiO2 (%)   -- -- -- -- --      Pain Score       --           Orthostatic Vital Signs  There were no vitals filed for this visit  Physical Exam   Constitutional: He appears well-developed and well-nourished  No distress  HENT:   Head: Normocephalic and atraumatic  Right Ear: External ear normal    Left Ear: External ear normal    Nose: Nose normal    Eyes: Pupils are equal, round, and reactive to light  Conjunctivae are normal  Right eye exhibits no discharge  Left eye exhibits no discharge  No scleral icterus  Neck: Normal range of motion  Neck supple  Cardiovascular: Normal rate, regular rhythm, normal heart sounds and intact distal pulses  Exam reveals no gallop and no friction rub  No murmur heard  Pulmonary/Chest: Effort normal and breath sounds normal  No stridor  No respiratory distress  He has no wheezes  He has no rales  He exhibits no tenderness  Abdominal: Soft  Bowel sounds are normal  He exhibits no distension and no mass  There is no tenderness   There is no rebound and no guarding  No hernia  Musculoskeletal: Normal range of motion  He exhibits no edema, tenderness or deformity  Neurological: He is alert  Patient demented at baseline, intermittently follows commands and answers questions appropriately occasionally, wife states this is patient's baseline   Skin: Skin is warm and dry  Capillary refill takes less than 2 seconds  No rash noted  He is not diaphoretic  No erythema  No pallor  Psychiatric: He has a normal mood and affect  His behavior is normal  Judgment and thought content normal    Nursing note and vitals reviewed  ED Medications  Medications   neomycin-bacitracin-polymyxin b (NEOSPORIN) ointment 1 small application (1 small application Topical Given 2/22/19 2127)   neomycin-bacitracin-polymyxin b (NEOSPORIN) ointment 1 small application (1 small application Topical Given 2/22/19 2127)       Diagnostic Studies  Results Reviewed     None                 No orders to display         Procedures  Procedures      Phone Consults  ED Phone Contact    ED Course  ED Course as of Feb 22 2132 Fri Feb 22, 2019 2118 Extensive conversation with wife, who states that he is acting himself and that she does not want any testing done at this time  She is just requesting that we bandage the abrasion to his L forehead and send him back to the acute care facility he is at  She understands the risk of ICH/neck injury              Identification of Seniors at Risk      Most Recent Value   (ISAR) Identification of Seniors at Risk   Before the illness or injury that brought you to the Emergency, did you need someone to help you on a regular basis? 1 Filed at: 02/22/2019 2035   In the last 24 hours, have you needed more help than usual?  0 Filed at: 02/22/2019 2035   Have you been hospitalized for one or more nights during the past 6 months?   1 Filed at: 02/22/2019 2035   In general, do you see well?  0 Filed at: 02/22/2019 2035   In general, do you have serious problems with your memory? 1 Filed at: 02/22/2019 2035   Do you take more than three different medications every day? 1 Filed at: 02/22/2019 2035   ISAR Score  4 Filed at: 02/22/2019 2035                          MDM    Disposition  Final diagnoses:   Abrasion of forehead, initial encounter   Abrasion of left forearm, initial encounter   Closed head injury, initial encounter     Time reflects when diagnosis was documented in both MDM as applicable and the Disposition within this note     Time User Action Codes Description Comment    2/22/2019  9:29 PM Helena Rubin 15 Abrasion of forehead, initial encounter     2/22/2019  9:29 PM Adriana Cortez Add [[id number]] Abrasion of left forearm, initial encounter     2/22/2019  9:32 PM Adriana Miller [[id number]] Closed head injury, initial encounter       ED Disposition     ED Disposition Condition Date/Time Comment    Discharge Stable Fri Feb 22, 2019  9:29 PM Dominick Churchill  discharge to home/self care  Follow-up Information    None         Patient's Medications   Discharge Prescriptions    No medications on file     No discharge procedures on file  ED Provider  Attending physically available and evaluated Dominick Churchill I managed the patient along with the ED Attending      Electronically Signed by         Will Rodriguez DO  02/22/19 0309

## 2019-02-27 ENCOUNTER — PATIENT OUTREACH (OUTPATIENT)
Dept: FAMILY MEDICINE CLINIC | Facility: CLINIC | Age: 79
End: 2019-02-27

## 2019-02-28 NOTE — PROGRESS NOTES
2/28/19 [place] returned my call  She put Braulio Carr in [facility] on 2/8/19  She said he started on physical therapy  He is not eating well and does not recognize her when she comes  She asked about scheduling with Adeel Gallegos  I said he is at pcp office every Friday morning she can call office to schedule when she is ready

## 2023-10-11 NOTE — PROGRESS NOTES
Outpatient Care Management Note: Spoke with Herminia Tracey patients wife  [employer] has delivered [employer]  She will call if she needs anything else 
 used
No